# Patient Record
Sex: MALE | Race: BLACK OR AFRICAN AMERICAN | NOT HISPANIC OR LATINO | Employment: FULL TIME | URBAN - METROPOLITAN AREA
[De-identification: names, ages, dates, MRNs, and addresses within clinical notes are randomized per-mention and may not be internally consistent; named-entity substitution may affect disease eponyms.]

---

## 2022-01-03 ENCOUNTER — OFFICE VISIT (OUTPATIENT)
Dept: URGENT CARE | Facility: URGENT CARE | Age: 27
End: 2022-01-03
Payer: MEDICAID

## 2022-01-03 VITALS
SYSTOLIC BLOOD PRESSURE: 116 MMHG | RESPIRATION RATE: 16 BRPM | DIASTOLIC BLOOD PRESSURE: 70 MMHG | HEART RATE: 44 BPM | TEMPERATURE: 98.4 F | OXYGEN SATURATION: 100 %

## 2022-01-03 DIAGNOSIS — R05.9 COUGH: Primary | ICD-10-CM

## 2022-01-03 DIAGNOSIS — R11.2 NAUSEA AND VOMITING, INTRACTABILITY OF VOMITING NOT SPECIFIED, UNSPECIFIED VOMITING TYPE: ICD-10-CM

## 2022-01-03 LAB
DEPRECATED S PYO AG THROAT QL EIA: NEGATIVE
FLUAV AG SPEC QL IA: NEGATIVE
FLUBV AG SPEC QL IA: NEGATIVE
GROUP A STREP BY PCR: NOT DETECTED

## 2022-01-03 PROCEDURE — U0005 INFEC AGEN DETEC AMPLI PROBE: HCPCS | Performed by: PHYSICIAN ASSISTANT

## 2022-01-03 PROCEDURE — 99204 OFFICE O/P NEW MOD 45 MIN: CPT | Performed by: PHYSICIAN ASSISTANT

## 2022-01-03 PROCEDURE — U0003 INFECTIOUS AGENT DETECTION BY NUCLEIC ACID (DNA OR RNA); SEVERE ACUTE RESPIRATORY SYNDROME CORONAVIRUS 2 (SARS-COV-2) (CORONAVIRUS DISEASE [COVID-19]), AMPLIFIED PROBE TECHNIQUE, MAKING USE OF HIGH THROUGHPUT TECHNOLOGIES AS DESCRIBED BY CMS-2020-01-R: HCPCS | Performed by: PHYSICIAN ASSISTANT

## 2022-01-03 PROCEDURE — 87651 STREP A DNA AMP PROBE: CPT | Performed by: PHYSICIAN ASSISTANT

## 2022-01-03 PROCEDURE — 87804 INFLUENZA ASSAY W/OPTIC: CPT | Performed by: PHYSICIAN ASSISTANT

## 2022-01-03 RX ORDER — DEXTROMETHORPHAN POLISTIREX 30 MG/5ML
60 SUSPENSION ORAL 2 TIMES DAILY
Qty: 148 ML | Refills: 0 | Status: SHIPPED | OUTPATIENT
Start: 2022-01-03 | End: 2024-01-24

## 2022-01-03 RX ORDER — ONDANSETRON 4 MG/1
4 TABLET, ORALLY DISINTEGRATING ORAL EVERY 8 HOURS PRN
Qty: 12 TABLET | Refills: 0 | Status: SHIPPED | OUTPATIENT
Start: 2022-01-03 | End: 2024-01-24

## 2022-01-03 NOTE — PROGRESS NOTES
Assessment & Plan     Cough  Delsym for cough  covid pending,  Check my chart  - Streptococcus A Rapid Screen w/Reflex to PCR  - Symptomatic; Yes; 12/30/2021 COVID-19 Virus (Coronavirus) by PCR Nose  - Influenza A/B antigen  - Group A Streptococcus PCR Throat Swab  - dextromethorphan (DELSYM) 30 MG/5ML liquid; Take 10 mLs (60 mg) by mouth 2 times daily    Nausea and vomiting, intractability of vomiting not specified, unspecified vomiting type  zofran for nausea  Strep and flu neg  - ondansetron (ZOFRAN-ODT) 4 MG ODT tab; Take 1 tablet (4 mg) by mouth every 8 hours as needed for nausea    Review of external notes as documented elsewhere in note  30 minutes spent on the date of the encounter doing chart review, review of outside records, review of test results, interpretation of tests, patient visit and documentation        No follow-ups on file.    Timoteo Rivas PA-C  [facility] is a 26 year old who presents for the following health issues     HPI     Nausea and vomiting  cough    Review of Systems   Constitutional, HEENT, cardiovascular, pulmonary, gi and gu systems are negative, except as otherwise noted.      Objective    /70   Pulse (!) 44   Temp 98.4  F (36.9  C) (Tympanic)   Resp 16   SpO2 100%   There is no height or weight on file to calculate BMI.  Physical Exam   GENERAL: healthy, alert and no distress  EYES: Eyes grossly normal to inspection, PERRL and conjunctivae and sclerae normal  HENT: normal cephalic/atraumatic, ear canals and TM's normal, nose and mouth without ulcers or lesions, oropharynx clear and oral mucous membranes moist  NECK: no adenopathy  RESP: lungs clear to auscultation - no rales, rhonchi or wheezes  CV: regular rate and rhythm, normal S1 S2, no S3 or S4, no murmur, click or rub, no peripheral edema and peripheral pulses strong  MS: no gross musculoskeletal defects noted, no edema  SKIN: no suspicious lesions or  rashes  NEURO: Normal strength and tone, mentation intact and speech normal  PSYCH: mentation appears normal, affect normal/bright    Results for orders placed or performed in visit on 01/03/22   Streptococcus A Rapid Screen w/Reflex to PCR     Status: Normal    Specimen: Throat; Swab   Result Value Ref Range    Group A Strep antigen Negative Negative   Influenza A/B antigen     Status: Normal    Specimen: Nasopharyngeal; Swab   Result Value Ref Range    Influenza A antigen Negative Negative    Influenza B antigen Negative Negative    Narrative    Test results must be correlated with clinical data. If necessary, results should be confirmed by a molecular assay or viral culture.

## 2022-01-04 LAB — SARS-COV-2 RNA RESP QL NAA+PROBE: POSITIVE

## 2022-02-06 ENCOUNTER — HEALTH MAINTENANCE LETTER (OUTPATIENT)
Age: 27
End: 2022-02-06

## 2022-04-07 ENCOUNTER — HOSPITAL ENCOUNTER (EMERGENCY)
Facility: CLINIC | Age: 27
Discharge: HOME | End: 2022-04-07
Attending: EMERGENCY MEDICINE | Admitting: EMERGENCY MEDICINE
Payer: MEDICAID

## 2022-04-07 ENCOUNTER — APPOINTMENT (OUTPATIENT)
Dept: GENERAL RADIOLOGY | Facility: CLINIC | Age: 27
End: 2022-04-07
Attending: EMERGENCY MEDICINE
Payer: MEDICAID

## 2022-04-07 VITALS
HEIGHT: 73 IN | WEIGHT: 160 LBS | DIASTOLIC BLOOD PRESSURE: 81 MMHG | SYSTOLIC BLOOD PRESSURE: 128 MMHG | OXYGEN SATURATION: 99 % | BODY MASS INDEX: 21.2 KG/M2 | HEART RATE: 62 BPM | RESPIRATION RATE: 17 BRPM | TEMPERATURE: 97.9 F

## 2022-04-07 DIAGNOSIS — R07.9 ACUTE CHEST PAIN: ICD-10-CM

## 2022-04-07 DIAGNOSIS — E87.6 HYPOKALEMIA: ICD-10-CM

## 2022-04-07 LAB
ALBUMIN SERPL-MCNC: 3.9 G/DL (ref 3.4–5)
ALP SERPL-CCNC: 83 U/L (ref 40–150)
ALT SERPL W P-5'-P-CCNC: 23 U/L (ref 0–70)
ANION GAP SERPL CALCULATED.3IONS-SCNC: 5 MMOL/L (ref 3–14)
AST SERPL W P-5'-P-CCNC: 17 U/L (ref 0–45)
ATRIAL RATE - MUSE: 60 BPM
BASOPHILS # BLD AUTO: 0 10E3/UL (ref 0–0.2)
BASOPHILS NFR BLD AUTO: 1 %
BILIRUB SERPL-MCNC: 2.1 MG/DL (ref 0.2–1.3)
BUN SERPL-MCNC: 16 MG/DL (ref 7–30)
CALCIUM SERPL-MCNC: 9.3 MG/DL (ref 8.5–10.1)
CHLORIDE BLD-SCNC: 105 MMOL/L (ref 94–109)
CO2 SERPL-SCNC: 25 MMOL/L (ref 20–32)
CREAT SERPL-MCNC: 0.85 MG/DL (ref 0.66–1.25)
D DIMER PPP FEU-MCNC: <0.27 UG/ML FEU (ref 0–0.5)
DIASTOLIC BLOOD PRESSURE - MUSE: NORMAL MMHG
EOSINOPHIL # BLD AUTO: 0 10E3/UL (ref 0–0.7)
EOSINOPHIL NFR BLD AUTO: 1 %
ERYTHROCYTE [DISTWIDTH] IN BLOOD BY AUTOMATED COUNT: 11.9 % (ref 10–15)
GFR SERPL CREATININE-BSD FRML MDRD: >90 ML/MIN/1.73M2
GLUCOSE BLD-MCNC: 153 MG/DL (ref 70–99)
HCT VFR BLD AUTO: 44.3 % (ref 40–53)
HGB BLD-MCNC: 15.2 G/DL (ref 13.3–17.7)
IMM GRANULOCYTES # BLD: 0 10E3/UL
IMM GRANULOCYTES NFR BLD: 0 %
INTERPRETATION ECG - MUSE: NORMAL
LIPASE SERPL-CCNC: 28 U/L (ref 73–393)
LYMPHOCYTES # BLD AUTO: 1.7 10E3/UL (ref 0.8–5.3)
LYMPHOCYTES NFR BLD AUTO: 52 %
MCH RBC QN AUTO: 31 PG (ref 26.5–33)
MCHC RBC AUTO-ENTMCNC: 34.3 G/DL (ref 31.5–36.5)
MCV RBC AUTO: 90 FL (ref 78–100)
MONOCYTES # BLD AUTO: 0.3 10E3/UL (ref 0–1.3)
MONOCYTES NFR BLD AUTO: 10 %
NEUTROPHILS # BLD AUTO: 1.2 10E3/UL (ref 1.6–8.3)
NEUTROPHILS NFR BLD AUTO: 36 %
NRBC # BLD AUTO: 0 10E3/UL
NRBC BLD AUTO-RTO: 0 /100
P AXIS - MUSE: 81 DEGREES
PLATELET # BLD AUTO: 255 10E3/UL (ref 150–450)
POTASSIUM BLD-SCNC: 3.1 MMOL/L (ref 3.4–5.3)
PR INTERVAL - MUSE: 154 MS
PROT SERPL-MCNC: 7.9 G/DL (ref 6.8–8.8)
QRS DURATION - MUSE: 98 MS
QT - MUSE: 420 MS
QTC - MUSE: 420 MS
R AXIS - MUSE: 65 DEGREES
RBC # BLD AUTO: 4.91 10E6/UL (ref 4.4–5.9)
SODIUM SERPL-SCNC: 135 MMOL/L (ref 133–144)
SYSTOLIC BLOOD PRESSURE - MUSE: NORMAL MMHG
T AXIS - MUSE: 42 DEGREES
TROPONIN I SERPL HS-MCNC: 5 NG/L
VENTRICULAR RATE- MUSE: 60 BPM
WBC # BLD AUTO: 3.3 10E3/UL (ref 4–11)

## 2022-04-07 PROCEDURE — 85025 COMPLETE CBC W/AUTO DIFF WBC: CPT | Performed by: EMERGENCY MEDICINE

## 2022-04-07 PROCEDURE — 93005 ELECTROCARDIOGRAM TRACING: CPT

## 2022-04-07 PROCEDURE — 99285 EMERGENCY DEPT VISIT HI MDM: CPT | Mod: 25

## 2022-04-07 PROCEDURE — 96374 THER/PROPH/DIAG INJ IV PUSH: CPT

## 2022-04-07 PROCEDURE — 258N000003 HC RX IP 258 OP 636: Performed by: EMERGENCY MEDICINE

## 2022-04-07 PROCEDURE — 80053 COMPREHEN METABOLIC PANEL: CPT | Performed by: EMERGENCY MEDICINE

## 2022-04-07 PROCEDURE — 84484 ASSAY OF TROPONIN QUANT: CPT | Performed by: EMERGENCY MEDICINE

## 2022-04-07 PROCEDURE — 83690 ASSAY OF LIPASE: CPT | Performed by: EMERGENCY MEDICINE

## 2022-04-07 PROCEDURE — 96361 HYDRATE IV INFUSION ADD-ON: CPT

## 2022-04-07 PROCEDURE — 36415 COLL VENOUS BLD VENIPUNCTURE: CPT | Performed by: EMERGENCY MEDICINE

## 2022-04-07 PROCEDURE — 85379 FIBRIN DEGRADATION QUANT: CPT | Performed by: EMERGENCY MEDICINE

## 2022-04-07 PROCEDURE — 250N000013 HC RX MED GY IP 250 OP 250 PS 637: Performed by: EMERGENCY MEDICINE

## 2022-04-07 PROCEDURE — 250N000011 HC RX IP 250 OP 636: Performed by: EMERGENCY MEDICINE

## 2022-04-07 PROCEDURE — 71046 X-RAY EXAM CHEST 2 VIEWS: CPT

## 2022-04-07 RX ORDER — KETOROLAC TROMETHAMINE 15 MG/ML
15 INJECTION, SOLUTION INTRAMUSCULAR; INTRAVENOUS ONCE
Status: COMPLETED | OUTPATIENT
Start: 2022-04-07 | End: 2022-04-07

## 2022-04-07 RX ORDER — POTASSIUM CHLORIDE 1500 MG/1
40 TABLET, EXTENDED RELEASE ORAL ONCE
Status: COMPLETED | OUTPATIENT
Start: 2022-04-07 | End: 2022-04-07

## 2022-04-07 RX ADMIN — POTASSIUM CHLORIDE 40 MEQ: 1500 TABLET, EXTENDED RELEASE ORAL at 16:18

## 2022-04-07 RX ADMIN — SODIUM CHLORIDE 1000 ML: 9 INJECTION, SOLUTION INTRAVENOUS at 14:54

## 2022-04-07 RX ADMIN — KETOROLAC TROMETHAMINE 15 MG: 15 INJECTION, SOLUTION INTRAMUSCULAR; INTRAVENOUS at 15:00

## 2022-04-07 ASSESSMENT — ENCOUNTER SYMPTOMS
VOMITING: 0
DIARRHEA: 0
MYALGIAS: 0
FEVER: 0
DIAPHORESIS: 1
COUGH: 0
ABDOMINAL PAIN: 0
SHORTNESS OF BREATH: 1

## 2022-04-07 NOTE — ED NOTES
Bed: ED21  Expected date: 4/7/22  Expected time: 2:21 PM  Means of arrival:   Comments:  BV1  10 min  Chest pain, SOB

## 2022-04-07 NOTE — ED PROVIDER NOTES
History   Chief Complaint:  Chest Pain     The history is provided by the patient.      Opal Tan is a 26 year old male who presents via EMS with chest pain. Patient developed sharp midsternal and left sided chest pain suddenly while eating a fish sandwich. He became very hot and started sweating. He had difficulty breathing then had a syncopal episode. He did not hit his head when he passed out. He woke up on the couch and was going in and out. When EMS came to his house he was more alert and calm. Here in the ED he still has chest pain but other symptoms are resolved. His pain is intermittent now. It does not worsen with movement or deep breaths. He does not feel like anything got stuck in his throat. He has been fasting for the past 4-5 days for Mandaen purposes. Denies vomiting. No leg pain or swelling. No cough or fever. No abdominal pain or diarrhea. No recent travel. No recent injury or trauma. His mother and grandmother have had blood clots. No known clotting disorder. He started a new job yesterday.     Review of Systems   Constitutional: Positive for diaphoresis. Negative for fever.   Respiratory: Positive for shortness of breath. Negative for cough.    Cardiovascular: Positive for chest pain. Negative for leg swelling.   Gastrointestinal: Negative for abdominal pain, diarrhea and vomiting.   Musculoskeletal: Negative for myalgias.   Neurological: Positive for syncope.   All other systems reviewed and are negative.    Allergies:  The patient has no known allergies.     Medications:  The patient denies the use of medications.     Past Medical History:     The patient denies past medical history.       Social History:  Presents via EMS   Presents to ED with his girlfriend   Occasional alcohol use  No drug use     Physical Exam     Patient Vitals for the past 24 hrs:   BP Temp Temp src Pulse Resp SpO2 Height Weight   04/07/22 1447 106/77 -- -- 60 -- 100 % -- --   04/07/22 1439 106/72 97.9  F  "(36.6  C) Oral 59 18 100 % 1.854 m (6' 1\") 72.6 kg (160 lb)       Physical Exam  Nursing note and vitals reviewed.  Constitutional:  Appears well-developed and well-nourished.   HENT:   Head:    Atraumatic.   Mouth/Throat:   Oropharynx is clear and moist. No oropharyngeal exudate.   Eyes:    Pupils are equal, round, and reactive to light.   Neck:    Normal range of motion. Neck supple.      No tracheal deviation present. No thyromegaly present.   Cardiovascular:  Normal rate, regular rhythm, no murmur   Pulmonary/Chest: Breath sounds are clear and equal without wheezes or crackles. Nontender chest wall.   Abdominal:   Soft. Bowel sounds are normal. Exhibits no distension and      no mass. There is no tenderness.      There is no rebound and no guarding.   Musculoskeletal:  Exhibits no edema.   Lymphadenopathy:  No cervical adenopathy.   Neurological:   Alert and oriented to person, place, and time.      GCS 15.  CN 2-12 intact.  and proximal upper extremity strength strong and equal.  Bilateral lower extremity strength strong and equal, including strong dorsiflexion and plantarflexion strength.  Sensation intact and equal to the face, arms and legs.  No facial droop or weakness. Normal speech.  Follows commands and answers questions normally.    Skin:    Skin is warm and dry. No rash noted. No pallor.    Emergency Department Course   ECG  ECG obtained at 1443, ECG read at 1445  Normal sinus rhythm  Right atrial enlargement   Minimal voltage criteria for LVH, may be normal variant   Nonspecific ST abnormality   Abnormal ECG    Rate 60 bpm. NM interval 154 ms. QRS duration 98 ms. QT/QTc 420/420 ms. P-R-T axes 81 65 42.     Imaging:  Chest XR,  PA & LAT   Final Result   IMPRESSION: PA and lateral views of the chest were obtained.   Cardiomediastinal silhouette is within normal limits. No suspicious   focal pulmonary opacities. No significant pleural effusion or   pneumothorax.      ASIA HASSAN MD          "   SYSTEM ID:  [id number]      Report per radiology    Laboratory:  Labs Ordered and Resulted from Time of ED Arrival to Time of ED Departure   COMPREHENSIVE METABOLIC PANEL - Abnormal       Result Value    Sodium 135      Potassium 3.1 (*)     Chloride 105      Carbon Dioxide (CO2) 25      Anion Gap 5      Urea Nitrogen 16      Creatinine 0.85      Calcium 9.3      Glucose 153 (*)     Alkaline Phosphatase 83      AST 17      ALT 23      Protein Total 7.9      Albumin 3.9      Bilirubin Total 2.1 (*)     GFR Estimate >90     LIPASE - Abnormal    Lipase 28 (*)    CBC WITH PLATELETS AND DIFFERENTIAL - Abnormal    WBC Count 3.3 (*)     RBC Count 4.91      Hemoglobin 15.2      Hematocrit 44.3      MCV 90      MCH 31.0      MCHC 34.3      RDW 11.9      Platelet Count 255      % Neutrophils 36      % Lymphocytes 52      % Monocytes 10      % Eosinophils 1      % Basophils 1      % Immature Granulocytes 0      NRBCs per 100 WBC 0      Absolute Neutrophils 1.2 (*)     Absolute Lymphocytes 1.7      Absolute Monocytes 0.3      Absolute Eosinophils 0.0      Absolute Basophils 0.0      Absolute Immature Granulocytes 0.0      Absolute NRBCs 0.0     D DIMER QUANTITATIVE - Normal    D-Dimer Quantitative <0.27     TROPONIN I - Normal    Troponin I High Sensitivity 5        Emergency Department Course:     Reviewed:  I reviewed nursing notes, vitals, past medical history and Care Everywhere    Assessments:  1447 I obtained history and examined the patient as noted above.   1617 I rechecked and updated the patient.   1641 I rechecked the patient and explained findings.     Interventions:  1454 NS 1L IV   1500 Toradol 15 mg IV   1618 Klor-con m 40 mEq PO     Disposition:  The patient was discharged to home.     Impression & Plan     Medical Decision Making:  Opal Tan is a 26 year old male who presents with chest pain.  The work up in the Emergency Department is negative.  I considered a broad differential diagnosis in this  patient including life-threatening etiologies such as acute coronary syndrome, myocardial infarction, pulmonary embolism, acute aortic dissection, myocarditis, pericarditis,  amongst others.  Other causes considered for this patient included pneumonia, pneumothorax, chest wall source, pericarditis, pleurisy, esophageal spasm, etc.  No sign of pneumothorax or pneumonia on chest x-ray. Dimer was normal. No signs suggesting PE. No serious etiology for the chest pain were detected today during this visit.  Close follow up with primary care is indicated should the pain continue, as further work up may be performed; this was made clear to the patient, who understands.      Diagnosis:    ICD-10-CM    1. Acute chest pain  R07.9    2. Hypokalemia  E87.6        Scribe Disclosure:  I, Quintin Lopez, am serving as a scribe at 2:35 PM on 4/7/2022 to document services personally performed by Liset Yancey MD based on my observations and the provider's statements to me.            Liset Yancey MD  04/07/22 3363

## 2022-04-07 NOTE — ED TRIAGE NOTES
Arrives via EMS from home with chest pain.  Patient reports was eating a fish sandwich when sudden onset of midsternal chest pain.  States became diaphoretic and short of breath then fainted. 4lead in route unremarkable. LS clear t/o. VSS.  Chest pain rating 6/10 at this time, radiating to throat area. ABCD's intact; A/O x 4.

## 2022-04-12 ENCOUNTER — HOSPITAL ENCOUNTER (EMERGENCY)
Facility: CLINIC | Age: 27
Discharge: HOME | End: 2022-04-12
Attending: EMERGENCY MEDICINE | Admitting: EMERGENCY MEDICINE
Payer: MEDICAID

## 2022-04-12 ENCOUNTER — APPOINTMENT (OUTPATIENT)
Dept: GENERAL RADIOLOGY | Facility: CLINIC | Age: 27
End: 2022-04-12
Attending: EMERGENCY MEDICINE
Payer: MEDICAID

## 2022-04-12 VITALS
DIASTOLIC BLOOD PRESSURE: 71 MMHG | HEART RATE: 52 BPM | BODY MASS INDEX: 21.77 KG/M2 | TEMPERATURE: 98.8 F | OXYGEN SATURATION: 97 % | WEIGHT: 165 LBS | RESPIRATION RATE: 20 BRPM | SYSTOLIC BLOOD PRESSURE: 108 MMHG

## 2022-04-12 DIAGNOSIS — R07.89 OTHER CHEST PAIN: ICD-10-CM

## 2022-04-12 DIAGNOSIS — J21.0 RSV BRONCHIOLITIS: ICD-10-CM

## 2022-04-12 DIAGNOSIS — R60.9 EDEMA, UNSPECIFIED TYPE: ICD-10-CM

## 2022-04-12 LAB
ALBUMIN SERPL-MCNC: 3.3 G/DL (ref 3.4–5)
ALP SERPL-CCNC: 133 U/L (ref 40–150)
ALT SERPL W P-5'-P-CCNC: 47 U/L (ref 0–70)
ANION GAP SERPL CALCULATED.3IONS-SCNC: 3 MMOL/L (ref 3–14)
AST SERPL W P-5'-P-CCNC: 68 U/L (ref 0–45)
BASOPHILS # BLD AUTO: 0 10E3/UL (ref 0–0.2)
BASOPHILS NFR BLD AUTO: 1 %
BILIRUB DIRECT SERPL-MCNC: 0.2 MG/DL (ref 0–0.2)
BILIRUB SERPL-MCNC: 0.4 MG/DL (ref 0.2–1.3)
BUN SERPL-MCNC: 11 MG/DL (ref 7–30)
CALCIUM SERPL-MCNC: 8.3 MG/DL (ref 8.5–10.1)
CHLORIDE BLD-SCNC: 108 MMOL/L (ref 94–109)
CO2 SERPL-SCNC: 28 MMOL/L (ref 20–32)
CREAT SERPL-MCNC: 0.78 MG/DL (ref 0.66–1.25)
D DIMER PPP FEU-MCNC: <0.27 UG/ML FEU (ref 0–0.5)
EOSINOPHIL # BLD AUTO: 0.3 10E3/UL (ref 0–0.7)
EOSINOPHIL NFR BLD AUTO: 6 %
ERYTHROCYTE [DISTWIDTH] IN BLOOD BY AUTOMATED COUNT: 12.3 % (ref 10–15)
FLUAV RNA SPEC QL NAA+PROBE: NEGATIVE
FLUBV RNA RESP QL NAA+PROBE: NEGATIVE
GFR SERPL CREATININE-BSD FRML MDRD: >90 ML/MIN/1.73M2
GLUCOSE BLD-MCNC: 91 MG/DL (ref 70–99)
HCT VFR BLD AUTO: 39.5 % (ref 40–53)
HGB BLD-MCNC: 12.9 G/DL (ref 13.3–17.7)
HOLD SPECIMEN: NORMAL
HOLD SPECIMEN: NORMAL
IMM GRANULOCYTES # BLD: 0 10E3/UL
IMM GRANULOCYTES NFR BLD: 0 %
INR PPP: 1.12 (ref 0.85–1.15)
LYMPHOCYTES # BLD AUTO: 1.9 10E3/UL (ref 0.8–5.3)
LYMPHOCYTES NFR BLD AUTO: 41 %
MCH RBC QN AUTO: 30.6 PG (ref 26.5–33)
MCHC RBC AUTO-ENTMCNC: 32.7 G/DL (ref 31.5–36.5)
MCV RBC AUTO: 94 FL (ref 78–100)
MONOCYTES # BLD AUTO: 0.5 10E3/UL (ref 0–1.3)
MONOCYTES NFR BLD AUTO: 10 %
MONOCYTES NFR BLD AUTO: NEGATIVE %
NEUTROPHILS # BLD AUTO: 2 10E3/UL (ref 1.6–8.3)
NEUTROPHILS NFR BLD AUTO: 42 %
NRBC # BLD AUTO: 0 10E3/UL
NRBC BLD AUTO-RTO: 0 /100
PLATELET # BLD AUTO: 221 10E3/UL (ref 150–450)
POTASSIUM BLD-SCNC: 3.9 MMOL/L (ref 3.4–5.3)
PROT SERPL-MCNC: 6.8 G/DL (ref 6.8–8.8)
RBC # BLD AUTO: 4.21 10E6/UL (ref 4.4–5.9)
RSV RNA SPEC NAA+PROBE: POSITIVE
SARS-COV-2 RNA RESP QL NAA+PROBE: NEGATIVE
SODIUM SERPL-SCNC: 139 MMOL/L (ref 133–144)
TROPONIN I SERPL HS-MCNC: 5 NG/L
WBC # BLD AUTO: 4.6 10E3/UL (ref 4–11)

## 2022-04-12 PROCEDURE — 36415 COLL VENOUS BLD VENIPUNCTURE: CPT | Performed by: EMERGENCY MEDICINE

## 2022-04-12 PROCEDURE — 250N000013 HC RX MED GY IP 250 OP 250 PS 637: Performed by: EMERGENCY MEDICINE

## 2022-04-12 PROCEDURE — 96360 HYDRATION IV INFUSION INIT: CPT

## 2022-04-12 PROCEDURE — 99285 EMERGENCY DEPT VISIT HI MDM: CPT | Mod: 25

## 2022-04-12 PROCEDURE — 85379 FIBRIN DEGRADATION QUANT: CPT | Performed by: EMERGENCY MEDICINE

## 2022-04-12 PROCEDURE — 71046 X-RAY EXAM CHEST 2 VIEWS: CPT

## 2022-04-12 PROCEDURE — C9803 HOPD COVID-19 SPEC COLLECT: HCPCS

## 2022-04-12 PROCEDURE — 85025 COMPLETE CBC W/AUTO DIFF WBC: CPT | Performed by: EMERGENCY MEDICINE

## 2022-04-12 PROCEDURE — 85610 PROTHROMBIN TIME: CPT | Performed by: EMERGENCY MEDICINE

## 2022-04-12 PROCEDURE — 258N000003 HC RX IP 258 OP 636: Performed by: EMERGENCY MEDICINE

## 2022-04-12 PROCEDURE — 80048 BASIC METABOLIC PNL TOTAL CA: CPT | Performed by: EMERGENCY MEDICINE

## 2022-04-12 PROCEDURE — 93005 ELECTROCARDIOGRAM TRACING: CPT

## 2022-04-12 PROCEDURE — 87637 SARSCOV2&INF A&B&RSV AMP PRB: CPT | Performed by: EMERGENCY MEDICINE

## 2022-04-12 PROCEDURE — 84484 ASSAY OF TROPONIN QUANT: CPT | Performed by: EMERGENCY MEDICINE

## 2022-04-12 PROCEDURE — 86308 HETEROPHILE ANTIBODY SCREEN: CPT | Performed by: EMERGENCY MEDICINE

## 2022-04-12 PROCEDURE — 82040 ASSAY OF SERUM ALBUMIN: CPT | Performed by: EMERGENCY MEDICINE

## 2022-04-12 RX ORDER — ASPIRIN 81 MG/1
324 TABLET, CHEWABLE ORAL ONCE
Status: COMPLETED | OUTPATIENT
Start: 2022-04-12 | End: 2022-04-12

## 2022-04-12 RX ORDER — SODIUM CHLORIDE 9 MG/ML
INJECTION, SOLUTION INTRAVENOUS CONTINUOUS
Status: DISCONTINUED | OUTPATIENT
Start: 2022-04-12 | End: 2022-04-13 | Stop reason: HOSPADM

## 2022-04-12 RX ADMIN — ASPIRIN 81 MG 324 MG: 81 TABLET ORAL at 21:54

## 2022-04-12 RX ADMIN — SODIUM CHLORIDE 500 ML: 9 INJECTION, SOLUTION INTRAVENOUS at 21:47

## 2022-04-13 LAB
ATRIAL RATE - MUSE: 53 BPM
DIASTOLIC BLOOD PRESSURE - MUSE: NORMAL MMHG
INTERPRETATION ECG - MUSE: NORMAL
P AXIS - MUSE: 27 DEGREES
PR INTERVAL - MUSE: 160 MS
QRS DURATION - MUSE: 90 MS
QT - MUSE: 452 MS
QTC - MUSE: 424 MS
R AXIS - MUSE: 56 DEGREES
SYSTOLIC BLOOD PRESSURE - MUSE: NORMAL MMHG
T AXIS - MUSE: 35 DEGREES
VENTRICULAR RATE- MUSE: 53 BPM

## 2022-04-13 NOTE — ED TRIAGE NOTES
Pt arrives from home w/ complaints of new bilat leg swelling and feet swelling. Pt reports being seen last week in ED for CP, SOB, and syncope. Pt reports today having new non-pitting edema in his legs and feet. Pt reports fatigue, intm sharp left sided CP w/ assoc SOB. Pt reports having another ep of syncope 3 days ago, denies hitting his head. A&O x 4.

## 2022-04-13 NOTE — ED PROVIDER NOTES
History     Chief Complaint:  Chest pain    HPI   Opal Tan is a 26 year old male who presents with complaints of chest pain on the left sternal border intermittently for the last several days.  The patient had been seen 5 days ago with similar symptoms but reports now he has fatigue 1 day of lower extremity swelling.  He says the pain is sharp he had a previous syncopal episode before the the other episode he has felt lightheaded especially with standing over the stove but has not passed out.  The patient said he has felt fatigue has had no sick contacts no vomiting no diarrhea.  He denies any recent bleeding and presented to the ER.  The patient said that he has had recent antibiotics due to a spider bite on his left leg reports the symptoms are better.  He does not have pain over his abdomen back or lower extremities.    Review of Systems  10 point review of systems all negative in HPI    Allergies:    No Known Allergies      Medications:      dextromethorphan (DELSYM) 30 MG/5ML liquid  ondansetron (ZOFRAN-ODT) 4 MG ODT tab        Past Medical History:    hypokalemia     Past Surgical History:    None      Social History:  Occasional alcohol use    Physical Exam     Patient Vitals for the past 24 hrs:   BP Temp Temp src Pulse Resp SpO2 Weight   04/12/22 2253 -- -- -- -- -- 97 % --   04/12/22 2252 108/71 -- -- 52 -- 97 % --   04/12/22 1940 116/58 98.8  F (37.1  C) Temporal 66 20 98 % 74.8 kg (165 lb)       Physical Exam  General: The patient is alert, in no respiratory distress.    HENT: Mucous membranes moist    Cardiovascular: Regular rate and rhythm. Good pulses in all four extremities. Normal capillary refill and skin turgor.     Respiratory: Lungs are clear. No nasal flaring. No retractions. No wheezing, no crackles.    Gastrointestinal: Abdomen soft. No guarding, no rebound. No palpable hernias.     Musculoskeletal: No gross deformity.     Skin: No rashes or petechiae.  Small healing skin  ulceration of the left calf    Neurologic: The patient is alert and oriented he has adequate strength follows commands and is appropriately interactive    Lymphatic: No cervical adenopathy.  Nonpitting but mild bilateral lower extremity edema    Psychiatric: The patient is non-tearful.    Emergency Department Course   ECG:\  Normal sinus rhythm no pathologic ST ovation but rate of 53 LA interval 160 Curosurf 90 and a QTC of 424.  There is some early repole    Imaging:  XR Chest 2 Views   Final Result   IMPRESSION: Negative chest.          Laboratory:  Labs Ordered and Resulted from Time of ED Arrival to Time of ED Departure   BASIC METABOLIC PANEL - Abnormal       Result Value    Sodium 139      Potassium 3.9      Chloride 108      Carbon Dioxide (CO2) 28      Anion Gap 3      Urea Nitrogen 11      Creatinine 0.78      Calcium 8.3 (*)     Glucose 91      GFR Estimate >90     CBC WITH PLATELETS AND DIFFERENTIAL - Abnormal    WBC Count 4.6      RBC Count 4.21 (*)     Hemoglobin 12.9 (*)     Hematocrit 39.5 (*)     MCV 94      MCH 30.6      MCHC 32.7      RDW 12.3      Platelet Count 221      % Neutrophils 42      % Lymphocytes 41      % Monocytes 10      % Eosinophils 6      % Basophils 1      % Immature Granulocytes 0      NRBCs per 100 WBC 0      Absolute Neutrophils 2.0      Absolute Lymphocytes 1.9      Absolute Monocytes 0.5      Absolute Eosinophils 0.3      Absolute Basophils 0.0      Absolute Immature Granulocytes 0.0      Absolute NRBCs 0.0     HEPATIC FUNCTION PANEL - Abnormal    Bilirubin Total 0.4      Bilirubin Direct 0.2      Protein Total 6.8      Albumin 3.3 (*)     Alkaline Phosphatase 133      AST 68 (*)     ALT 47     INFLUENZA A/B & SARS-COV2 PCR MULTIPLEX - Abnormal    Influenza A PCR Negative      Influenza B PCR Negative      RSV PCR Positive (*)     SARS CoV2 PCR Negative     TROPONIN I - Normal    Troponin I High Sensitivity 5     INR - Normal    INR 1.12     D DIMER QUANTITATIVE - Normal     D-Dimer Quantitative <0.27     MONONUCLEOSIS SCREEN - Normal    Mononucleosis Screen Negative         Procedures:      Emergency Department Course:           Reviewed:    I reviewed nursing notes, vitals and past history    Assessments:   I obtained history and examined the patient as noted above.    I rechecked the patient and explained findings.       Consults:            Interventions:    Medications   sodium chloride 0.9% infusion ( Intravenous Canceled Entry 4/12/22 2154)   aspirin (ASA) chewable tablet 324 mg (324 mg Oral Given 4/12/22 2154)   0.9% sodium chloride BOLUS (0 mLs Intravenous Stopped 4/12/22 2256)       Disposition:  The patient was discharged to home.    Impression & Plan        Medical Decision Making:  The patient's previous visits were reviewed he is no significant medical problems reports that he quit smoking previously.  His previous work-up including D-dimer was negative.  At this point the patient does have chest pain I considered pneumonia pneumothorax PE or even referred intra-abdominal causes.  With the patient's lower extremity edema I did consider DVT however this is less likely with lack of travel.  There has been a spider bite and reactive dependent edema is possible I do not think he has cardiomyopathy or renal insufficiency.  Labs were checked mono would explain his weakness if that is the case.  The patient does have a lower hemoglobin compared to previous but has not had bleeding I think he may have been relatively dehydrated before with hydration has been some delusional drop in his hemoglobin.  The patient is not particularly low at at this point but that I discussed that he would need follow-up to see what the trend is in the next couple of days.  The patient has bronchiolitis which likely explains his weakness.  He is otherwise stable I discussed the need for follow-up and he was discharged home in good condition        Diagnosis:    ICD-10-CM    1. Other chest pain  R07.89     2. RSV bronchiolitis  J21.0    3. Edema, unspecified type  R60.9        Discharge Medications:  Discharge Medication List as of 4/12/2022 11:15 PM               Linus Lynch MD  04/13/22 0040

## 2022-07-12 ENCOUNTER — HOSPITAL ENCOUNTER (EMERGENCY)
Facility: CLINIC | Age: 27
Discharge: HOME | End: 2022-07-13
Attending: EMERGENCY MEDICINE | Admitting: EMERGENCY MEDICINE
Payer: COMMERCIAL

## 2022-07-12 DIAGNOSIS — E86.0 MILD DEHYDRATION: ICD-10-CM

## 2022-07-12 LAB
BASOPHILS # BLD AUTO: 0 10E3/UL (ref 0–0.2)
BASOPHILS NFR BLD AUTO: 0 %
EOSINOPHIL # BLD AUTO: 0 10E3/UL (ref 0–0.7)
EOSINOPHIL NFR BLD AUTO: 0 %
ERYTHROCYTE [DISTWIDTH] IN BLOOD BY AUTOMATED COUNT: 12.8 % (ref 10–15)
HCT VFR BLD AUTO: 44 % (ref 40–53)
HGB BLD-MCNC: 14.3 G/DL (ref 13.3–17.7)
IMM GRANULOCYTES # BLD: 0.1 10E3/UL
IMM GRANULOCYTES NFR BLD: 1 %
LYMPHOCYTES # BLD AUTO: 1.1 10E3/UL (ref 0.8–5.3)
LYMPHOCYTES NFR BLD AUTO: 10 %
MCH RBC QN AUTO: 30.8 PG (ref 26.5–33)
MCHC RBC AUTO-ENTMCNC: 32.5 G/DL (ref 31.5–36.5)
MCV RBC AUTO: 95 FL (ref 78–100)
MONOCYTES # BLD AUTO: 0.8 10E3/UL (ref 0–1.3)
MONOCYTES NFR BLD AUTO: 7 %
NEUTROPHILS # BLD AUTO: 9 10E3/UL (ref 1.6–8.3)
NEUTROPHILS NFR BLD AUTO: 82 %
NRBC # BLD AUTO: 0 10E3/UL
NRBC BLD AUTO-RTO: 0 /100
PLATELET # BLD AUTO: 216 10E3/UL (ref 150–450)
RBC # BLD AUTO: 4.65 10E6/UL (ref 4.4–5.9)
WBC # BLD AUTO: 10.9 10E3/UL (ref 4–11)

## 2022-07-12 PROCEDURE — 99284 EMERGENCY DEPT VISIT MOD MDM: CPT | Mod: CS,25

## 2022-07-12 PROCEDURE — 36415 COLL VENOUS BLD VENIPUNCTURE: CPT | Performed by: EMERGENCY MEDICINE

## 2022-07-12 PROCEDURE — 96374 THER/PROPH/DIAG INJ IV PUSH: CPT

## 2022-07-12 PROCEDURE — 80048 BASIC METABOLIC PNL TOTAL CA: CPT | Performed by: EMERGENCY MEDICINE

## 2022-07-12 PROCEDURE — 250N000011 HC RX IP 250 OP 636: Performed by: EMERGENCY MEDICINE

## 2022-07-12 PROCEDURE — 82550 ASSAY OF CK (CPK): CPT | Performed by: EMERGENCY MEDICINE

## 2022-07-12 PROCEDURE — 93005 ELECTROCARDIOGRAM TRACING: CPT

## 2022-07-12 PROCEDURE — 96375 TX/PRO/DX INJ NEW DRUG ADDON: CPT

## 2022-07-12 PROCEDURE — 84484 ASSAY OF TROPONIN QUANT: CPT | Performed by: EMERGENCY MEDICINE

## 2022-07-12 PROCEDURE — 87637 SARSCOV2&INF A&B&RSV AMP PRB: CPT | Performed by: EMERGENCY MEDICINE

## 2022-07-12 PROCEDURE — 96361 HYDRATE IV INFUSION ADD-ON: CPT

## 2022-07-12 PROCEDURE — C9803 HOPD COVID-19 SPEC COLLECT: HCPCS

## 2022-07-12 PROCEDURE — 258N000003 HC RX IP 258 OP 636: Performed by: EMERGENCY MEDICINE

## 2022-07-12 PROCEDURE — 85025 COMPLETE CBC W/AUTO DIFF WBC: CPT | Performed by: EMERGENCY MEDICINE

## 2022-07-12 RX ORDER — ONDANSETRON 2 MG/ML
4 INJECTION INTRAMUSCULAR; INTRAVENOUS ONCE
Status: COMPLETED | OUTPATIENT
Start: 2022-07-12 | End: 2022-07-12

## 2022-07-12 RX ORDER — KETOROLAC TROMETHAMINE 15 MG/ML
15 INJECTION, SOLUTION INTRAMUSCULAR; INTRAVENOUS ONCE
Status: COMPLETED | OUTPATIENT
Start: 2022-07-12 | End: 2022-07-12

## 2022-07-12 RX ADMIN — KETOROLAC TROMETHAMINE 15 MG: 15 INJECTION, SOLUTION INTRAMUSCULAR; INTRAVENOUS at 23:35

## 2022-07-12 RX ADMIN — ONDANSETRON 4 MG: 2 INJECTION INTRAMUSCULAR; INTRAVENOUS at 23:35

## 2022-07-12 RX ADMIN — SODIUM CHLORIDE 1000 ML: 9 INJECTION, SOLUTION INTRAVENOUS at 23:35

## 2022-07-12 ASSESSMENT — ENCOUNTER SYMPTOMS
DIZZINESS: 1
FEVER: 0
VOMITING: 1
MYALGIAS: 1
NAUSEA: 1
HEADACHES: 1
SHORTNESS OF BREATH: 1
CHILLS: 1
FATIGUE: 1

## 2022-07-13 VITALS
RESPIRATION RATE: 9 BRPM | SYSTOLIC BLOOD PRESSURE: 92 MMHG | OXYGEN SATURATION: 100 % | HEART RATE: 55 BPM | TEMPERATURE: 98.8 F | DIASTOLIC BLOOD PRESSURE: 43 MMHG

## 2022-07-13 LAB
ANION GAP SERPL CALCULATED.3IONS-SCNC: 9 MMOL/L (ref 3–14)
ATRIAL RATE - MUSE: 50 BPM
BUN SERPL-MCNC: 22 MG/DL (ref 7–30)
CALCIUM SERPL-MCNC: 9.8 MG/DL (ref 8.5–10.1)
CHLORIDE BLD-SCNC: 104 MMOL/L (ref 94–109)
CK SERPL-CCNC: 352 U/L (ref 30–300)
CO2 SERPL-SCNC: 25 MMOL/L (ref 20–32)
CREAT SERPL-MCNC: 0.96 MG/DL (ref 0.66–1.25)
DIASTOLIC BLOOD PRESSURE - MUSE: NORMAL MMHG
FLUAV RNA SPEC QL NAA+PROBE: NEGATIVE
FLUBV RNA RESP QL NAA+PROBE: NEGATIVE
GFR SERPL CREATININE-BSD FRML MDRD: >90 ML/MIN/1.73M2
GLUCOSE BLD-MCNC: 84 MG/DL (ref 70–99)
INTERPRETATION ECG - MUSE: NORMAL
P AXIS - MUSE: 84 DEGREES
POTASSIUM BLD-SCNC: 3.7 MMOL/L (ref 3.4–5.3)
PR INTERVAL - MUSE: 152 MS
QRS DURATION - MUSE: 88 MS
QT - MUSE: 446 MS
QTC - MUSE: 406 MS
R AXIS - MUSE: 63 DEGREES
RSV RNA SPEC NAA+PROBE: NEGATIVE
SARS-COV-2 RNA RESP QL NAA+PROBE: NEGATIVE
SODIUM SERPL-SCNC: 138 MMOL/L (ref 133–144)
SYSTOLIC BLOOD PRESSURE - MUSE: NORMAL MMHG
T AXIS - MUSE: 46 DEGREES
TROPONIN I SERPL HS-MCNC: 8 NG/L
VENTRICULAR RATE- MUSE: 50 BPM

## 2022-07-13 RX ORDER — ONDANSETRON 4 MG/1
4 TABLET, ORALLY DISINTEGRATING ORAL EVERY 6 HOURS PRN
Qty: 10 TABLET | Refills: 0 | Status: SHIPPED | OUTPATIENT
Start: 2022-07-13 | End: 2022-07-16

## 2022-07-13 NOTE — ED TRIAGE NOTES
Pt worked out  At the gym, on the way maricarmen had a sudden onset of fatigue, n/v.  Now has the chills, body aches, and a headache.     Triage Assessment     Row Name 07/12/22 3736       Triage Assessment (Adult)    Airway WDL WDL       Respiratory WDL    Respiratory WDL WDL

## 2022-07-13 NOTE — ED PROVIDER NOTES
History   Chief Complaint:  Fatigue       HPI   Opal Tan is a 26 year old male who presents with fatigue. The patient states he was boxing as usual tonight and then on the drive home suddenly developed dizziness, shortness of breath and fatigue. When he got home he developed nausea and vomiting. He is now also experiencing chills, myalgias and a headache. He did nothing abnormal in his workout. Denies fever. He is not vaccinated against Covid-19.     Review of Systems   Constitutional: Positive for chills and fatigue. Negative for fever.   Respiratory: Positive for shortness of breath.    Gastrointestinal: Positive for nausea and vomiting.   Musculoskeletal: Positive for myalgias.   Neurological: Positive for dizziness and headaches.   All other systems reviewed and are negative.      Allergies:  No Known Drug Allergies    Medications:  None    Past Medical History:     Patient denies any known medical conditions    Social History:  The patient presents to the ED with a significant other  PCP: Sandoval Richardson MD    Physical Exam     Patient Vitals for the past 24 hrs:   BP Temp Temp src Pulse Resp SpO2   07/13/22 0012 -- 98.8  F (37.1  C) Oral -- -- --   07/12/22 2345 94/52 -- -- 55 14 100 %   07/12/22 2330 110/67 -- -- 53 17 99 %   07/12/22 2315 115/68 -- -- 53 18 98 %       Physical Exam  Vitals reviewed.   HENT:      Head: Normocephalic.      Mouth/Throat:      Mouth: Mucous membranes are moist.   Eyes:      Conjunctiva/sclera: Conjunctivae normal.      Pupils: Pupils are equal, round, and reactive to light.   Cardiovascular:      Rate and Rhythm: Normal rate and regular rhythm.   Pulmonary:      Effort: Pulmonary effort is normal.      Breath sounds: Normal breath sounds.   Abdominal:      General: Abdomen is flat.      Palpations: Abdomen is soft.   Skin:     General: Skin is warm.      Capillary Refill: Capillary refill takes less than 2 seconds.   Neurological:      General: No focal deficit  present.      Mental Status: He is alert and oriented to person, place, and time.   Psychiatric:         Mood and Affect: Mood normal.           Emergency Department Course   ECG  ECG results from 07/12/22   EKG 12 lead     Value    Systolic Blood Pressure     Diastolic Blood Pressure     Ventricular Rate 50    Atrial Rate 50    DE Interval 152    QRS Duration 88        QTc 406    P Axis 84    R AXIS 63    T Axis 46    Interpretation ECG      Sinus bradycardia  Right atrial enlargement  Voltage criteria for left ventricular hypertrophy  Early repolarization  Abnormal ECG  When compared with ECG of 12-APR-2022 21:39,  T wave amplitude has increased in Lateral leads       Laboratory:  Labs Ordered and Resulted from Time of ED Arrival to Time of ED Departure   CK TOTAL - Abnormal       Result Value     (*)    CBC WITH PLATELETS AND DIFFERENTIAL - Abnormal    WBC Count 10.9      RBC Count 4.65      Hemoglobin 14.3      Hematocrit 44.0      MCV 95      MCH 30.8      MCHC 32.5      RDW 12.8      Platelet Count 216      % Neutrophils 82      % Lymphocytes 10      % Monocytes 7      % Eosinophils 0      % Basophils 0      % Immature Granulocytes 1      NRBCs per 100 WBC 0      Absolute Neutrophils 9.0 (*)     Absolute Lymphocytes 1.1      Absolute Monocytes 0.8      Absolute Eosinophils 0.0      Absolute Basophils 0.0      Absolute Immature Granulocytes 0.1      Absolute NRBCs 0.0     BASIC METABOLIC PANEL - Normal    Sodium 138      Potassium 3.7      Chloride 104      Carbon Dioxide (CO2) 25      Anion Gap 9      Urea Nitrogen 22      Creatinine 0.96      Calcium 9.8      Glucose 84      GFR Estimate >90     TROPONIN I - Normal    Troponin I High Sensitivity 8     INFLUENZA A/B & SARS-COV2 PCR MULTIPLEX - Normal    Influenza A PCR Negative      Influenza B PCR Negative      RSV PCR Negative      SARS CoV2 PCR Negative        Emergency Department Course:       Reviewed:  I reviewed nursing notes, vitals, past  medical history and Care Everywhere    Assessments:  2320 I obtained history and examined the patient as noted above.   0109 I rechecked the patient and explained findings.     Interventions:  2335: Bolus 1 L IV  2335: Toradol 15 mg IV  2335: Zofran 4 mg IV    Disposition:  The patient was discharged to home.     Impression & Plan     Medical Decision Making:  Patient presents after chills body aches and not feeling well.  This is after boxing.  He is not febrile.  COVID and flu testing are negative.  Could be viral illness.  Wonder about dehydration due to acute illness after exercise in the heat.  IV fluids and antinausea medication made to feel patient feel better no clinical concerns for meningitis or bacteremia.  Patient symptoms resolved after IV fluids and was discharged in stable condition.    Diagnosis:    ICD-10-CM    1. Mild dehydration  E86.0        Discharge Medications:  Discharge Medication List as of 7/13/2022  1:15 AM      START taking these medications    Details   !! ondansetron (ZOFRAN ODT) 4 MG ODT tab Take 1 tablet (4 mg) by mouth every 6 hours as needed for nausea or vomiting, Disp-10 tablet, R-0, Local Print       !! - Potential duplicate medications found. Please discuss with provider.          Scribe Disclosure:  I, Brie Zheng, am serving as a scribe at 11:19 PM on 7/12/2022 to document services personally performed by Neeraj Don MD based on my observations and the provider's statements to me.            Neeraj Don MD  07/15/22 2316

## 2022-07-13 NOTE — DISCHARGE INSTRUCTIONS
We have found no test answers to chest pain or feeling lightheaded or weak.  We suspect mild dehydration as you have been boxing in the heat.  Continue to orally hydrate using 4 to 6 glasses of 8 ounces of water a day may be more if you are doing a lot of cardiovascular exercise.  Use Zofran when needed for nausea continue Tylenol or ibuprofen for muscle aches.  Return with high fever increased shortness of breath or shortness of breath with exertion or other concerns.

## 2022-10-03 ENCOUNTER — HEALTH MAINTENANCE LETTER (OUTPATIENT)
Age: 27
End: 2022-10-03

## 2023-01-04 ENCOUNTER — HOSPITAL ENCOUNTER (EMERGENCY)
Facility: CLINIC | Age: 28
Discharge: HOME | End: 2023-01-04
Attending: PHYSICIAN ASSISTANT | Admitting: PHYSICIAN ASSISTANT
Payer: COMMERCIAL

## 2023-01-04 VITALS
TEMPERATURE: 99.9 F | HEART RATE: 73 BPM | DIASTOLIC BLOOD PRESSURE: 79 MMHG | OXYGEN SATURATION: 100 % | SYSTOLIC BLOOD PRESSURE: 137 MMHG | RESPIRATION RATE: 22 BRPM

## 2023-01-04 DIAGNOSIS — J02.0 STREPTOCOCCAL PHARYNGITIS: ICD-10-CM

## 2023-01-04 DIAGNOSIS — R82.81 PYURIA: ICD-10-CM

## 2023-01-04 LAB
ALBUMIN UR-MCNC: 70 MG/DL
APPEARANCE UR: CLEAR
BILIRUB UR QL STRIP: NEGATIVE
COLOR UR AUTO: YELLOW
DEPRECATED S PYO AG THROAT QL EIA: POSITIVE
FLUAV RNA SPEC QL NAA+PROBE: NEGATIVE
FLUBV RNA RESP QL NAA+PROBE: NEGATIVE
GLUCOSE UR STRIP-MCNC: NEGATIVE MG/DL
HGB UR QL STRIP: NEGATIVE
HYALINE CASTS: 2 /LPF
KETONES UR STRIP-MCNC: >150 MG/DL
LEUKOCYTE ESTERASE UR QL STRIP: ABNORMAL
MUCOUS THREADS #/AREA URNS LPF: PRESENT /LPF
NITRATE UR QL: NEGATIVE
PH UR STRIP: 7 [PH] (ref 5–7)
RBC URINE: 3 /HPF
RSV RNA SPEC NAA+PROBE: NEGATIVE
SARS-COV-2 RNA RESP QL NAA+PROBE: NEGATIVE
SP GR UR STRIP: 1.01 (ref 1–1.03)
UROBILINOGEN UR STRIP-MCNC: 6 MG/DL
WBC URINE: 17 /HPF

## 2023-01-04 PROCEDURE — 87637 SARSCOV2&INF A&B&RSV AMP PRB: CPT | Performed by: EMERGENCY MEDICINE

## 2023-01-04 PROCEDURE — 87086 URINE CULTURE/COLONY COUNT: CPT | Performed by: PHYSICIAN ASSISTANT

## 2023-01-04 PROCEDURE — C9803 HOPD COVID-19 SPEC COLLECT: HCPCS

## 2023-01-04 PROCEDURE — 81001 URINALYSIS AUTO W/SCOPE: CPT | Performed by: PHYSICIAN ASSISTANT

## 2023-01-04 PROCEDURE — 99283 EMERGENCY DEPT VISIT LOW MDM: CPT

## 2023-01-04 PROCEDURE — 250N000013 HC RX MED GY IP 250 OP 250 PS 637: Performed by: EMERGENCY MEDICINE

## 2023-01-04 PROCEDURE — 87880 STREP A ASSAY W/OPTIC: CPT | Performed by: PHYSICIAN ASSISTANT

## 2023-01-04 RX ORDER — ACETAMINOPHEN 500 MG
1000 TABLET ORAL EVERY 4 HOURS PRN
Status: DISCONTINUED | OUTPATIENT
Start: 2023-01-04 | End: 2023-01-04 | Stop reason: HOSPADM

## 2023-01-04 RX ORDER — ONDANSETRON 4 MG/1
4 TABLET, ORALLY DISINTEGRATING ORAL ONCE
Status: DISCONTINUED | OUTPATIENT
Start: 2023-01-04 | End: 2023-01-04 | Stop reason: HOSPADM

## 2023-01-04 RX ORDER — PENICILLIN V POTASSIUM 500 MG/1
500 TABLET, FILM COATED ORAL 2 TIMES DAILY
Qty: 20 TABLET | Refills: 0 | Status: SHIPPED | OUTPATIENT
Start: 2023-01-04 | End: 2023-01-14

## 2023-01-04 RX ORDER — IBUPROFEN 600 MG/1
600 TABLET, FILM COATED ORAL ONCE
Status: COMPLETED | OUTPATIENT
Start: 2023-01-04 | End: 2023-01-04

## 2023-01-04 RX ADMIN — IBUPROFEN 600 MG: 600 TABLET ORAL at 09:04

## 2023-01-04 RX ADMIN — ACETAMINOPHEN 1000 MG: 500 TABLET ORAL at 09:04

## 2023-01-04 ASSESSMENT — ENCOUNTER SYMPTOMS
SORE THROAT: 1
COUGH: 1
NAUSEA: 1
VOMITING: 1

## 2023-01-04 NOTE — LETTER
January 4, 2023      To Whom It May Concern:      Opal Tan was seen in our Emergency Department today, 01/04/23.  I expect his condition to improve over the next 1-2 days.  He may return to work when improved.    Sincerely,        Caio Rogers PA-C

## 2023-01-04 NOTE — ED TRIAGE NOTES
A&O x4, ABCs intact. Pt presents with generalized body aches, vomiting and coughing that started last night. Pt last took ibuprofen last night        Triage Assessment     Row Name 01/04/23 0900       Triage Assessment (Adult)    Airway WDL WDL       Respiratory WDL    Respiratory WDL WDL       Cardiac WDL    Cardiac WDL WDL

## 2023-01-04 NOTE — ED PROVIDER NOTES
History     Chief Complaint:  Pharyngitis, Vomiting, Cough     HPI   Opal Tan is a 27 year old male who presents to the ED for evaluation of pharyngitis, vomiting, and cough.  Patient reports shortly prior to bed last night he developed low back pain and thereafter developed chills and diaphoresis.  He then developed pharyngitis and cough, and overnight was up innumerable times vomiting.  No measured fever.  No rhinorrhea or congestion.  No ear pain.  No abdominal pain.    Independent Historian: Yes    ROS:  Review of Systems   HENT: Positive for sore throat.    Respiratory: Positive for cough.    Gastrointestinal: Positive for nausea and vomiting.   All other systems reviewed and are negative.      Allergies:  No Known Allergies     Medications:    None    Past Medical History:    Reviewed, no pertinent past medical history    Social History:  Here with significant other.    PCP: No Ref-Primary, Physician     Physical Exam     Patient Vitals for the past 24 hrs:   BP Temp Temp src Pulse Resp SpO2   01/04/23 0901 137/79 99.9  F (37.7  C) Temporal 73 22 100 %        Physical Exam  Constitutional: Pleasant. Cooperative.   Eyes: Pupils equally round and reactive  HENT: Head is normal in appearance. TMs normal. Moist mucous membranes. Oropharyngeal erythema. No exudates. No palatal asymmetry. Uvula midline. No trismus. No muffled voice. Tolerating their secretions.  Cardiovascular: Regular rate and rhythm.  Respiratory: Normal respiratory effort, lungs are clear bilaterally.  Neck: Normal ROM.   Skin: Normal, without rash.  Neurologic: Cranial nerves grossly intact. Alert.  Nursing notes and vital signs reviewed.    Emergency Department Course     Laboratory:  Labs Ordered and Resulted from Time of ED Arrival to Time of ED Departure   ROUTINE UA WITH MICROSCOPIC REFLEX TO CULTURE - Abnormal       Result Value    Color Urine Yellow      Appearance Urine Clear      Glucose Urine Negative      Bilirubin Urine  Negative      Ketones Urine >150 (*)     Specific Gravity Urine 1.015      Blood Urine Negative      pH Urine 7.0      Protein Albumin Urine 70 (*)     Urobilinogen Urine 6.0 (*)     Nitrite Urine Negative      Leukocyte Esterase Urine Moderate (*)     Mucus Urine Present (*)     RBC Urine 3 (*)     WBC Urine 17 (*)     Hyaline Casts Urine 2     STREPTOCOCCUS A RAPID SCREEN W REFELX TO PCR - Abnormal    Group A Strep antigen Positive (*)    INFLUENZA A/B & SARS-COV2 PCR MULTIPLEX - Normal    Influenza A PCR Negative      Influenza B PCR Negative      RSV PCR Negative      SARS CoV2 PCR Negative     URINE CULTURE      Emergency Department Course & Assessments:    Interventions:  Medications   acetaminophen (TYLENOL) tablet 1,000 mg (1,000 mg Oral Given 1/4/23 0904)   ondansetron (ZOFRAN ODT) ODT tab 4 mg (has no administration in time range)   ibuprofen (ADVIL/MOTRIN) tablet 600 mg (600 mg Oral Given 1/4/23 0904)      Disposition:  The patient was discharged to home.     Impression & Plan      Medical Decision Making:  Opal Tan is a 27 year old male who presents to the ED for evaluation of pharyngitis, vomiting, and cough. See HPI as above for additional details. Vitals and physical exam as above. DDx was broad and included strep, COVID, influenza, PNA, stone, pyelo, PTA, RPA, intraabodminal pathology such as appendicitis, amongst others. Strep swab returns positive. Suspect this as etiology of patient's symptoms. No CVA TTP, and no red cells on UA to suggest for stone. Patient does have a few WBCs on urine, but no urinary symptoms so doubt this is true UTI, but will culture urine. No abdominal pain, benign abdominal exam. Will provide Abx as below. [place] patient was safe for discharge to home with ongoing Tylenol and ibuprofen. Discussed reasons to return. All questions answered. Patient discharged to home in stable condition.    Diagnosis:    ICD-10-CM    1. Streptococcal pharyngitis  J02.0       2. Pyuria   R82.81            Discharge Medications:  New Prescriptions    PENICILLIN V (VEETID) 500 MG TABLET    Take 1 tablet (500 mg) by mouth 2 times daily for 10 days        1/4/2023   Caio Rogers PA-C     This record was created at least in part using electronic voice recognition software, so please excuse any typographical errors.       Caio Rogers PA-C  01/04/23 2333

## 2023-01-04 NOTE — DISCHARGE INSTRUCTIONS
Use Tylenol and ibuprofen for pain control.  Take full course of antibiotics as prescribed.  Push fluids.  You have a few white cells in your urine. We will culture your urine at this time to ensure that this is not infectious. Given you lack of symptoms, I have low suspicion.

## 2023-01-06 LAB — BACTERIA UR CULT: NO GROWTH

## 2023-02-12 ENCOUNTER — HEALTH MAINTENANCE LETTER (OUTPATIENT)
Age: 28
End: 2023-02-12

## 2023-11-28 ENCOUNTER — HOSPITAL ENCOUNTER (EMERGENCY)
Facility: CLINIC | Age: 28
Discharge: HOME | End: 2023-11-28
Attending: EMERGENCY MEDICINE | Admitting: EMERGENCY MEDICINE
Payer: COMMERCIAL

## 2023-11-28 VITALS
WEIGHT: 155 LBS | DIASTOLIC BLOOD PRESSURE: 73 MMHG | OXYGEN SATURATION: 98 % | RESPIRATION RATE: 18 BRPM | HEIGHT: 71 IN | SYSTOLIC BLOOD PRESSURE: 130 MMHG | HEART RATE: 50 BPM | BODY MASS INDEX: 21.7 KG/M2 | TEMPERATURE: 99 F

## 2023-11-28 DIAGNOSIS — L50.9 URTICARIA: ICD-10-CM

## 2023-11-28 PROCEDURE — 250N000013 HC RX MED GY IP 250 OP 250 PS 637: Performed by: EMERGENCY MEDICINE

## 2023-11-28 PROCEDURE — 99283 EMERGENCY DEPT VISIT LOW MDM: CPT

## 2023-11-28 RX ORDER — DIPHENHYDRAMINE HCL 50 MG
50 CAPSULE ORAL ONCE
Status: COMPLETED | OUTPATIENT
Start: 2023-11-28 | End: 2023-11-28

## 2023-11-28 RX ADMIN — DIPHENHYDRAMINE HYDROCHLORIDE 50 MG: 50 CAPSULE ORAL at 06:30

## 2023-11-28 NOTE — Clinical Note
Opal Tan was seen and treated in our emergency department on 11/28/2023.  He may return to work on 11/30/2023.       If you have any questions or concerns, please don't hesitate to call.      Ciaran Gorman MD

## 2023-11-28 NOTE — DISCHARGE INSTRUCTIONS
You are seen in the emergency department for an ongoing intermittent rash and itching.  Your evaluation seems consistent with urticaria (hives).  This can be caused by a variety of illnesses and environmental exposures but in your case we do think it is probably related to something that he came into contact with on the job site.  We would recommend showering once or twice more and trying to wash off anything that might still be on the skin causing irritation.  Unfortunately this condition can wax and wane for a few days but you are on the correct treatment already.  We would recommend continuing the prednisone daily as previously prescribed and you can use Atarax for further itching.  In addition we would recommend 25 to 50 mg of Benadryl every 6 hours to help with managing histamine release.

## 2023-11-28 NOTE — ED PROVIDER NOTES
EMERGENCY DEPARTMENT ENCOUNTER      NAME: Opal aTn  AGE: 28 year old male  YOB: 1995  MRN: [id number]  EVALUATION DATE & TIME: 2023  6:11 AM    PCP: No Ref-Primary, Physician    ED PROVIDER: Ciaran Gorman M.D.      Chief Complaint   Patient presents with    Hives         FINAL IMPRESSION:  1. Urticaria          ED COURSE & MEDICAL DECISION MAKIN:25 AM I introduced myself to the patient, obtained patient history, performed a physical exam, and discussed plan for ED workup including potential diagnostic laboratory/imaging studies and interventions.    28 year old male presents to the Emergency Department for evaluation of intermittent urticaria.  Was seen yesterday in the urgency room for same symptoms.  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  On current exam I do not see anything for cutaneous rash at all.  Patient says this is waxing and waning.  I do think his  history seems consistent with uncomplicated urticaria.  No signs of any more serious process like anaphylaxis, infection, desquamation, etc.  He is already taking prednisone and hydroxyzine as prescribed yesterday.  We did discuss that he might get some additional benefit from a antihistamine like cetirizine or Benadryl and that urticaria can sometimes be a process which is ongoing for a few days.  Did discuss going home and washing the skin thoroughly to try to remove any offending agents.  Patient was in agreement with this plan and discharged in stable condition.    At the conclusion of the encounter I discussed the results of all of the tests and the disposition. The questions were answered. The patient or family acknowledged understanding and was agreeable with the care plan.       Medical Decision Making    History:  Supplemental history from: Family Member/Significant Other  External Record(s) reviewed: Other: Urgent Care Visit 23    Work Up:  Chart  documentation includes differential considered and any EKGs or imaging independently interpreted by provider, where specified.  In additional to work up documented, I considered the following work up: Documented in chart, if applicable.    External consultation:  Discussion of management with another provider: Documented in chart, if applicable    Complicating factors:  Care impacted by chronic illness: N/A  Care affected by social determinants of health: N/A    Disposition considerations: Discharge. No recommendations on prescription strength medication(s). See documentation for any additional details.            MEDICATIONS GIVEN IN THE EMERGENCY:  Medications   diphenhydrAMINE (BENADRYL) capsule 50 mg (50 mg Oral $Given 11/28/23 0630)       NEW PRESCRIPTIONS STARTED AT TODAY'S ER VISIT  New Prescriptions    No medications on file          =================================================================    HPI    Patient information was obtained from: Patient    Use of : N/A      Opal NEEL Tan is a 28 year old male with no pertinent history who presents to this ED by walk in with a friend for evaluation of hives.    Per chart review, the patient was seen at [facility]23 presenting for evaluation of a rash. No signs of angioedema, respiratory compromise, shock, or serious systemic reaction. Hives resolved after Benadryl injection and oral prednisone. The patient was prescribed hydroxyzine, prednisone, and Pepcid.    The patient reports yesterday morning around 0600 he developed a rash and hives to his bilateral lower extremities. Throughout the day the rash and hives spread up his torso to his upper extremities and face. He was seen in urgent care where he received a dose of IM Benadryl and was then given prescriptions for oral prednisone and hydroxyzine. Despite taking the prescribed medications, last dose was this morning, he has not noticed any significant improvement in the  "itchiness. The hives and rash seem to have mostly improved here in the ED, but the itchiness has worsened. He has not had any shortness of breath, tongue swelling, trouble swallowing, or vomiting.    REVIEW OF SYSTEMS   All systems reviewed and negative except as noted in HPI.    PAST MEDICAL HISTORY:  History reviewed. No pertinent past medical history.    PAST SURGICAL HISTORY:  History reviewed. No pertinent surgical history.        CURRENT MEDICATIONS:    No current facility-administered medications for this encounter.     Current Outpatient Medications   Medication    dextromethorphan (DELSYM) 30 MG/5ML liquid    ondansetron (ZOFRAN-ODT) 4 MG ODT tab         ALLERGIES:  No Known Allergies    FAMILY HISTORY:  History reviewed. No pertinent family history.    SOCIAL HISTORY:   Social History     Socioeconomic History    Marital status: Single   Tobacco Use    Smoking status: Never    Smokeless tobacco: Never       VITALS:  /73   Pulse 50   Temp 99  F (37.2  C) (Temporal)   Resp 18   Ht 1.803 m (5' 11\")   Wt 70.3 kg (155 lb)   SpO2 98%   BMI 21.62 kg/m      PHYSICAL EXAM    Constitutional: Well developed, Well nourished, NAD.  HENT: Normocephalic, Atraumatic. Neck Supple. No mucosal lesions.  Eyes: EOMI, Conjunctiva normal.  Respiratory: Breathing comfortably on room air. Speaks full sentences easily. Lungs clear to ascultation.  Cardiovascular: Normal heart rate, Regular rhythm. No peripheral edema.  Abdomen: Soft  Musculoskeletal: Good range of motion in all major joints. No major deformities noted.  Integument: Warm, Dry. No current rashes or lesions.  Neurologic: Alert & awake, Normal motor function, Normal sensory function, No focal deficits noted.   Psychiatric: Cooperative. Affect appropriate.       IRavi, am serving as a scribe to document services personally performed by Dr. Ciaran Gorman, based on my observation and the provider's statements to me. Ciaran GARCIA MD " attest that Ravi Stiles is acting in a scribe capacity, has observed my performance of the services and has documented them in accordance with my direction.    Ciaran Gorman M.D.  Emergency Medicine  [facility] EMERGENCY ROOM  [address] [facility] [address]  [phone]  Dept: [phone]     Ciaran Gorman MD  11/28/23 0637

## 2023-11-28 NOTE — ED TRIAGE NOTES
Pt developed hives yesterday morning and was seen in the UR, he was given benadryl and prednisone. Nothing is improving. No rash or swelling of face. No SOB.      Triage Assessment (Adult)       Row Name 11/28/23 0606          Triage Assessment    Airway WDL WDL        Respiratory WDL    Respiratory WDL WDL        Skin Circulation/Temperature WDL    Skin Circulation/Temperature WDL WDL        Cardiac WDL    Cardiac WDL WDL        Peripheral/Neurovascular WDL    Peripheral Neurovascular WDL WDL        Cognitive/Neuro/Behavioral WDL    Cognitive/Neuro/Behavioral WDL WDL

## 2024-01-19 ASSESSMENT — ENCOUNTER SYMPTOMS
MYALGIAS: 1
CHILLS: 0
EYE PAIN: 0
SHORTNESS OF BREATH: 1
PARESTHESIAS: 0
NAUSEA: 0
COUGH: 1
JOINT SWELLING: 0
CONSTIPATION: 0
DIARRHEA: 0
ABDOMINAL PAIN: 0
NERVOUS/ANXIOUS: 0
SORE THROAT: 0
HEMATOCHEZIA: 0
PALPITATIONS: 1
DYSURIA: 0
ARTHRALGIAS: 0
FREQUENCY: 0
HEARTBURN: 0
HEMATURIA: 0
DIZZINESS: 1
FEVER: 1
WEAKNESS: 1
HEADACHES: 1

## 2024-01-20 NOTE — COMMUNITY RESOURCES LIST (ENGLISH)
01/20/2024   [facility]  N/A  For questions about this resource list or additional care needs, please contact your primary care clinic or care manager.  Phone: [phone]   Email: N/A   Address: [address] [place] [address]   Hours: N/A        Financial Stability       Utility payment assistance  1  [place] [facility] - Utility payment assistance Distance: 0.85 miles      In-Person, Phone/Virtual   [address] [place] [address]  Language: English  Hours: Mon - Thu 9:00 AM - 3:00 PM  Fees: Free   Phone: (853) 344-6294 Email: Synagogue@[facility].org Website: http://www.[employer].org/care-ministries/     2  [place] [facility] Distance: 0.99 miles      In-Person   [address] [place] [address]  Language: English  Hours: Mon - Fri 8:00 AM - 3:00 PM  Fees: Free   Phone: (488) 497-2066 Ext.14 Email: [facility] Website: http://www.[facility].org          Food and Nutrition       Food pantry  3  [facility] Distance: 0.32 miles      Pickup   [address] [place] [address]  Language: English  Hours: Mon - Fri 8:30 AM - 2:00 PM  Fees: Free   Phone: (765) 419-3169 Email: info@[facility].org Website: http://www.[facility].org/locations/[facility]     4  [facility] Distance: 0.33 miles      Pickup   [address] [address]  Language: English  Hours: Mon - Thu 10:00 AM - 2:00 PM  Fees: Free   Phone: (111) 801-5929 Email: info@[facility].org Website: http://www.[facility].org     SNAP application assistance  5  [employer] [facility] Distance: 1.02 miles      In-Person   [address] [place] [address]  Language: English, Arabic  Hours: Mon - Fri 8:30 AM - 5:00 PM  Fees: Free   Phone: (981) 269-4858 Email: info@[facility] [facility].org Website: http://www.[facility].org/     6  [place]  [facility] Distance: 1.82 miles      Phone/Virtual   [address] [place] [address]  Language: English, Danish  Hours: Mon - Fri 10:00 AM - 5:00 PM  Fees: Free   Phone: (883) 462-2537 Email: armen@[facility].org Website: https://www.[facility].org/behzad-house     Soup kitchen or free meals  7  [facility] [employer] Distance: 0.32 miles      In-Person   [address] [place] [address]  Language: English  Hours: Mon - Fri 12:00 PM - 1:00 PM  Fees: Free   Phone: (380) 777-1125 Email: info@[facility].org Website: http://www.[facility].org/locations/abdoul_ca     8  [facility] - Community Meals Distance: 1.02 miles      In-Person   [address] [place] [id number]  Language: English, Nepali  Hours: Sun 11:00 AM - 12:00 PM  Fees: Free   Phone: (138) 369-8038 Email: rick@[employer] Website: http://www.[facility].org/          Hotlines and Helplines       Hotline - Housing crisis  9  [facility] Distance: 1.66 miles      Phone/Virtual   [address] [place] [id number]  Language: English  Hours: Mon - Sun Open 24 Hours   Phone: (591) 116-8365 Email: info@[facility].org Website: http://www.[facility].org     10  [facility] Distance: 1.74 miles      Phone/Virtual   [address] [place] [id number]  Language: English  Hours: Mon - Sun Open 24 Hours   Phone: (740) 134-5718 Email: communications@[facility].org Website: https://[facility]s-[place].org/oursaviourshousing/          Housing       Coordinated Entry access point  11  [facility] - Housing Services Distance: 1.57 miles      In-Person   [address] [place] [id number]  Language: English  Hours: Mon - Fri 9:00 AM - 5:00 PM  Fees: Free   Phone: (664) 941-5657 Email: housing@[facility].org Website: http://www.[facility].org/\[facility]\""     12  [facility]  [facility] Distance: 2.57 miles      In-Person   [address] [place] [address]  Language: English  Hours: Mon - Sat 10:00 PM - 5:00 PM  Fees: Free   Phone: (228) 811-1521 Email: info@[facility].org Website: http://www.[facility]aintRumford Community HospitalUser Replay/     Drop-in center or day shelter  13  [facility] Distance: 1.93 miles      In-Person   [address] [place] [address]  Language: English  Hours: Mon - Fri 12:00 PM - 3:00 PM  Fees: Free   Phone: (597) 720-3267 Email: [employer] Website: http://[employer].milabent/     14  [facility] Distance: 2.13 miles      In-Person   [address], [place] [address]  Language: English, Singaporean, Malian  Hours: Mon - Sat 7:00 AM - 3:00 PM  Fees: Free, Self Pay   Phone: (468) 295-5177 Email: info@[employer]ent Website: https://www.[employer].org/locations/opportunity-center/     Housing search assistance  15  [facility]  - Office of Multicultural Services Distance: 1.99 miles      Phone/Virtual   [address] [place] [address]  Language: American Sign Language, Irish, Ukrainian, English, Bengali, Hong Konger, Oromo, Filipino, Singaporean, Malian, Swahili, Kun, Chadian  Hours: Mon - Tue 9:00 AM - 4:00 PM , Wed 10:00 AM - 5:00 PM , Thu - Fri 9:00 AM - 4:00 PM  Fees: Free   Phone: (800) 129-7505 Email: oms@[employer]. Website: http://www.[employer]./residents/human-services/multi-cultural-services     16   [facility]) Distance: 2.11 miles      In-Person   [address] [place] [address]  Language: English, Singaporean, Malian  Hours: Mon - Fri 8:30 AM - 4:30 PM  Fees: Free   Phone: (496) 204-2059 Email: alpa@[facility].org Website: http://www.[facility].org/     Shelter for families  17  [facility] Distance: 20.6 miles      In-Person   [address] [facility] [place] [address]  Language:  English  Hours: Mon - Fri 3:00 PM - 9:00 AM , Sat - Sun Open 24 Hours  Fees: Free   Phone: (638) 481-4581 Ext.1 Website: https://www.[facility]aintandrews.[place]/2020/07/03/emergency-family-shelter/     Shelter for individuals  18  [facility] Distance: 1.74 miles      In-Person   [address] [place] [address]  Language: English  Hours: Mon - Sun Open 24 Hours  Fees: Free   Phone: (988) 443-8252 Email: communications@[facility].org Website: https://[facility]-[place].org/oursaviourshousing/     19  [facility] Distance: 2.82 miles      In-Person   [address] [place] [address]  Language: English  Hours: Mon - Fri 4:00 PM - 9:00 AM  Fees: Free   Phone: (356) 241-2050 Email: ibeth@[facility] Website: https://[facility].org/[facility]/          Transportation       Free or low-cost transportation  20  [facility] Distance: 0.76 miles      In-Person   [address] [place] [address]  Language: English  Hours: Mon - Fri 9:00 AM - 12:00 PM , Mon - Fri 1:00 PM - 3:00 PM  Fees: Self Pay   Phone: (625) 931-3388 Email: info@[facility].[place] Website: https://www.[facility].org/[place]     21  [facility] Distance: 0.99 miles      In-Person   [address] [place] [address]  Language: English  Hours: Mon - Fri 8:00 AM - 3:00 PM  Fees: Free   Phone: (157) 715-9992 Ext.14 Email: neighborhood@[facility] HospitalSavvySystems[facility].[place] Website: http://www.BetterWorks (Closed)[facility].org     Transportation to medical appointments  22  [employer] [facility] [place] Distance: 5.88 miles      In-Person   [address] [address]  Language: English  Hours: Mon - Sun Open 24 Hours  Fees: Insurance, Self Pay   Phone: (391) 113-8611 Website: https://www.[employer]/lashell/?L=true     23  [facility] [employer] Distance: 7.23 miles      In-Person   [address] [place] [address]  Language: English,  Ivorian  Hours: Mon - Sun Appt. Only  Fees: Self Pay   Phone: (108) 397-3184 Email: romulo@[employer] Website: http://www.[employer]/          Important Numbers & Websites       Emergency Services   911  [facility] Services   311  Poison Control   (443) 731-5859  Suicide Prevention Lifeline   (250) 784-6833 (TALK)  Child Abuse Hotline   (346) 990-2856 (4-A-Child)  Sexual Assault Hotline   (334) 607-7845 (HOPE)  National Runaway Safeline   (233) 900-7918 (RUNAWAY)  All-Options Talkline   (253) 936-9597  Substance Abuse Referral   (360) 978-4830 (HELP)

## 2024-01-24 ENCOUNTER — OFFICE VISIT (OUTPATIENT)
Dept: FAMILY MEDICINE | Facility: CLINIC | Age: 29
End: 2024-01-24
Payer: COMMERCIAL

## 2024-01-24 VITALS
TEMPERATURE: 98 F | BODY MASS INDEX: 20.99 KG/M2 | WEIGHT: 155 LBS | DIASTOLIC BLOOD PRESSURE: 78 MMHG | SYSTOLIC BLOOD PRESSURE: 124 MMHG | OXYGEN SATURATION: 100 % | HEART RATE: 74 BPM | HEIGHT: 72 IN

## 2024-01-24 DIAGNOSIS — Z13.220 SCREENING FOR LIPID DISORDERS: ICD-10-CM

## 2024-01-24 DIAGNOSIS — J45.20 MILD INTERMITTENT ASTHMA WITHOUT COMPLICATION: ICD-10-CM

## 2024-01-24 DIAGNOSIS — Z13.1 SCREENING FOR DIABETES MELLITUS: ICD-10-CM

## 2024-01-24 DIAGNOSIS — Z23 NEED FOR HPV VACCINE: ICD-10-CM

## 2024-01-24 DIAGNOSIS — Z59.00 HOMELESSNESS: ICD-10-CM

## 2024-01-24 DIAGNOSIS — Z11.4 SCREENING FOR HIV (HUMAN IMMUNODEFICIENCY VIRUS): ICD-10-CM

## 2024-01-24 DIAGNOSIS — F43.10 PTSD (POST-TRAUMATIC STRESS DISORDER): ICD-10-CM

## 2024-01-24 DIAGNOSIS — Z87.891 HISTORY OF NICOTINE VAPING: ICD-10-CM

## 2024-01-24 DIAGNOSIS — Z11.59 NEED FOR HEPATITIS C SCREENING TEST: ICD-10-CM

## 2024-01-24 DIAGNOSIS — Z00.00 ROUTINE GENERAL MEDICAL EXAMINATION AT A HEALTH CARE FACILITY: Primary | ICD-10-CM

## 2024-01-24 DIAGNOSIS — F33.0 MILD RECURRENT MAJOR DEPRESSION (H): ICD-10-CM

## 2024-01-24 DIAGNOSIS — F41.1 GENERALIZED ANXIETY DISORDER: ICD-10-CM

## 2024-01-24 DIAGNOSIS — Z11.3 SCREEN FOR STD (SEXUALLY TRANSMITTED DISEASE): ICD-10-CM

## 2024-01-24 PROCEDURE — 99213 OFFICE O/P EST LOW 20 MIN: CPT | Mod: 25 | Performed by: PHYSICIAN ASSISTANT

## 2024-01-24 PROCEDURE — 90686 IIV4 VACC NO PRSV 0.5 ML IM: CPT | Performed by: PHYSICIAN ASSISTANT

## 2024-01-24 PROCEDURE — 82947 ASSAY GLUCOSE BLOOD QUANT: CPT | Performed by: PHYSICIAN ASSISTANT

## 2024-01-24 PROCEDURE — 90651 9VHPV VACCINE 2/3 DOSE IM: CPT | Performed by: PHYSICIAN ASSISTANT

## 2024-01-24 PROCEDURE — 87491 CHLMYD TRACH DNA AMP PROBE: CPT | Performed by: PHYSICIAN ASSISTANT

## 2024-01-24 PROCEDURE — 36415 COLL VENOUS BLD VENIPUNCTURE: CPT | Performed by: PHYSICIAN ASSISTANT

## 2024-01-24 PROCEDURE — 80061 LIPID PANEL: CPT | Performed by: PHYSICIAN ASSISTANT

## 2024-01-24 PROCEDURE — 86803 HEPATITIS C AB TEST: CPT | Performed by: PHYSICIAN ASSISTANT

## 2024-01-24 PROCEDURE — 87591 N.GONORRHOEAE DNA AMP PROB: CPT | Performed by: PHYSICIAN ASSISTANT

## 2024-01-24 PROCEDURE — 90471 IMMUNIZATION ADMIN: CPT | Performed by: PHYSICIAN ASSISTANT

## 2024-01-24 PROCEDURE — 90472 IMMUNIZATION ADMIN EACH ADD: CPT | Performed by: PHYSICIAN ASSISTANT

## 2024-01-24 PROCEDURE — 86780 TREPONEMA PALLIDUM: CPT | Performed by: PHYSICIAN ASSISTANT

## 2024-01-24 PROCEDURE — 87389 HIV-1 AG W/HIV-1&-2 AB AG IA: CPT | Performed by: PHYSICIAN ASSISTANT

## 2024-01-24 PROCEDURE — 99395 PREV VISIT EST AGE 18-39: CPT | Mod: 25 | Performed by: PHYSICIAN ASSISTANT

## 2024-01-24 RX ORDER — ALBUTEROL SULFATE 90 UG/1
2 AEROSOL, METERED RESPIRATORY (INHALATION)
COMMUNITY
Start: 2023-12-08 | End: 2024-01-24

## 2024-01-24 RX ORDER — HYDROXYZINE HYDROCHLORIDE 25 MG/1
25-50 TABLET, FILM COATED ORAL
COMMUNITY
Start: 2023-11-27 | End: 2024-01-24

## 2024-01-24 RX ORDER — ALBUTEROL SULFATE 90 UG/1
2 AEROSOL, METERED RESPIRATORY (INHALATION) EVERY 4 HOURS PRN
Qty: 18 G | Refills: 1 | Status: SHIPPED | OUTPATIENT
Start: 2024-01-24

## 2024-01-24 SDOH — ECONOMIC STABILITY - HOUSING INSECURITY: HOMELESSNESS UNSPECIFIED: Z59.00

## 2024-01-24 ASSESSMENT — ENCOUNTER SYMPTOMS
DIARRHEA: 0
SHORTNESS OF BREATH: 1
NAUSEA: 0
DYSURIA: 0
ABDOMINAL PAIN: 0
FEVER: 1
CONSTIPATION: 0
COUGH: 1
SORE THROAT: 0
WEAKNESS: 1
ARTHRALGIAS: 0
NERVOUS/ANXIOUS: 0
FREQUENCY: 0
JOINT SWELLING: 0
CHILLS: 0
DIZZINESS: 1
HEMATURIA: 0
MYALGIAS: 1
PALPITATIONS: 1
EYE PAIN: 0
HEADACHES: 1

## 2024-01-24 ASSESSMENT — PATIENT HEALTH QUESTIONNAIRE - PHQ9
SUM OF ALL RESPONSES TO PHQ QUESTIONS 1-9: 9
SUM OF ALL RESPONSES TO PHQ QUESTIONS 1-9: 9
10. IF YOU CHECKED OFF ANY PROBLEMS, HOW DIFFICULT HAVE THESE PROBLEMS MADE IT FOR YOU TO DO YOUR WORK, TAKE CARE OF THINGS AT HOME, OR GET ALONG WITH OTHER PEOPLE: SOMEWHAT DIFFICULT
5. POOR APPETITE OR OVEREATING: NEARLY EVERY DAY

## 2024-01-24 ASSESSMENT — ANXIETY QUESTIONNAIRES
1. FEELING NERVOUS, ANXIOUS, OR ON EDGE: NEARLY EVERY DAY
2. NOT BEING ABLE TO STOP OR CONTROL WORRYING: NEARLY EVERY DAY
7. FEELING AFRAID AS IF SOMETHING AWFUL MIGHT HAPPEN: SEVERAL DAYS
5. BEING SO RESTLESS THAT IT IS HARD TO SIT STILL: SEVERAL DAYS
GAD7 TOTAL SCORE: 17
3. WORRYING TOO MUCH ABOUT DIFFERENT THINGS: NEARLY EVERY DAY
IF YOU CHECKED OFF ANY PROBLEMS ON THIS QUESTIONNAIRE, HOW DIFFICULT HAVE THESE PROBLEMS MADE IT FOR YOU TO DO YOUR WORK, TAKE CARE OF THINGS AT HOME, OR GET ALONG WITH OTHER PEOPLE: VERY DIFFICULT
6. BECOMING EASILY ANNOYED OR IRRITABLE: NEARLY EVERY DAY
GAD7 TOTAL SCORE: 17

## 2024-01-24 NOTE — PROGRESS NOTES
Answers submitted by the patient for this visit:  Patient Health Questionnaire (Submitted on 1/24/2024)  If you checked off any problems, how difficult have these problems made it for you to do your work, take care of things at home, or get along with other people?: Somewhat difficult  PHQ9 TOTAL SCORE: 9  Annual Preventive Visit (Submitted on 1/19/2024)  Chief Complaint: Annual Exam:  Blood in stool: No  heartburn: No  peripheral edema: No  mood changes: Yes  Skin sensation changes: No  impotence: No    Preventive Care Visit  [facility]  FLAKO Yost, Physician Assistant - Medical  Jan 24, 2024      SUBJECTIVE:   Opal is a 28 year old, presenting for the following:  No chief complaint on file.        1/24/2024     3:32 PM   Additional Questions   Accompanied by na         1/24/2024     3:32 PM   Patient Reported Additional Medications   Patient reports taking the following new medications none     Healthy Habits:     Getting at least 3 servings of Calcium per day:  Yes    Bi-annual eye exam:  Yes    Dental care twice a year:  NO    Sleep apnea or symptoms of sleep apnea:  None    Diet:  Other    Frequency of exercise:  2-3 days/week    Duration of exercise:  Greater than 60 minutes    Taking medications regularly:  Yes    Medication side effects:  None    Additional concerns today:  No    Additional provider notes :   Currently in school. 1st year in [employer]. It has been 3 yrs since he was incarcerated. He has been a struggle since incarcerated to adapted to society. He has a long history of PTSD, anxiety, and depression. He is not currently on any medication for it. When he was in retirement it was hard to get treatment for this. He is trying to get housing now and needs forms filled out today. He is currently homeless.          Today's PHQ-9 Score:       1/24/2024     3:22 PM   PHQ-9 SCORE   PHQ-9 Total Score MyChart 9 (Mild depression)   PHQ-9 Total Score 9         1/24/2024  "    4:10 PM   KAN-7 SCORE   Total Score 17        Have you ever done Advance Care Planning? (For example, a Health Directive, POLST, or a discussion with a medical provider or your loved ones about your wishes): No, advance care planning information given to patient to review.  Patient declined advance care planning discussion at this time.    Social History     Tobacco Use    Smoking status: Never    Smokeless tobacco: Never   Substance Use Topics    Alcohol use: Not on file           1/19/2024     8:18 PM   Alcohol Use   Prescreen: >3 drinks/day or >7 drinks/week? Not Applicable       Last PSA: No results found for: \"PSA\"    Reviewed orders with patient. Reviewed health maintenance and updated orders accordingly - Yes  Lab work is in process    Reviewed and updated as needed this visit by clinical staff                  Reviewed and updated as needed this visit by Provider                    Review of Systems   Constitutional:  Positive for fever. Negative for chills.   HENT:  Positive for congestion. Negative for ear pain, hearing loss and sore throat.    Eyes:  Negative for pain and visual disturbance.   Respiratory:  Positive for cough and shortness of breath.    Cardiovascular:  Positive for chest pain and palpitations.   Gastrointestinal:  Negative for abdominal pain, constipation, diarrhea and nausea.   Genitourinary:  Negative for dysuria, frequency, genital sores, hematuria, penile discharge and urgency.   Musculoskeletal:  Positive for myalgias. Negative for arthralgias and joint swelling.   Skin:  Negative for rash.   Neurological:  Positive for dizziness, weakness and headaches.   Psychiatric/Behavioral:  The patient is not nervous/anxious.          OBJECTIVE:   There were no vitals taken for this visit.   Estimated body mass index is 21.62 kg/m  as calculated from the following:    Height as of 11/28/23: 1.803 m (5' 11\").    Weight as of 11/28/23: 70.3 kg (155 lb).  Physical Exam  GENERAL: alert and no " distress  EYES: Eyes grossly normal to inspection, PERRL and conjunctivae and sclerae normal  HENT: ear canals and TM's normal, nose and mouth without ulcers or lesions  NECK: no adenopathy, no asymmetry, masses, or scars  RESP: lungs clear to auscultation - no rales, rhonchi or wheezes  CV: regular rate and rhythm, normal S1 S2, no S3 or S4, no murmur, click or rub, no peripheral edema  ABDOMEN: soft, nontender, no hepatosplenomegaly, no masses and bowel sounds normal  MS: no gross musculoskeletal defects noted, no edema  SKIN: no suspicious lesions or rashes  NEURO: Normal strength and tone, mentation intact and speech normal  PSYCH: mentation appears normal, affect normal/bright  LYMPH: no cervical, supraclavicular, axillary, or inguinal adenopathy    Diagnostic Test Results:  Labs reviewed in Epic    ASSESSMENT/PLAN:   Routine general medical examination at a health care facility  Repeat 1 year     Screening for HIV (human immunodeficiency virus)  - HIV Antigen Antibody Combo; Future    Need for hepatitis C screening test  - Hepatitis C Screen Reflex to HCV RNA Quant and Genotype; Future    History of nicotine vaping  Congratulated patient on quitting. Encouraged continue cessation    Generalized anxiety disorder  Chronic, diagnosed years ago,  is on no medication, his  requested he seeing a mental health specialist for his mental health conditions. I agree with this plan. Patient agree's with this plan and has no further questions  - Adult Mental Health  Referral; Future    PTSD (post-traumatic stress disorder)  Chronic, diagnosed years ago,  is on no medication, his  requested he seeing a mental health specialist for his mental health conditions. I agree with this plan. Patient agree's with this plan and has no further questions  - Adult Mental Health  Referral; Future    Mild recurrent major depression (H24)  Chronic, diagnosed years ago,  is on no  medication, his  requested he seeing a mental health specialist for his mental health conditions. I agree with this plan. Patient agree's with this plan and has no further questions  - Adult Mental Health  Referral; Future    Screening for diabetes mellitus  - Lipid panel reflex to direct LDL Fasting; Future    Screening for lipid disorders  - Glucose; Future    Screen for STD (sexually transmitted disease)  - Chlamydia trachomatis/Neisseria gonorrhoeae by PCR - Clinic Collect  - Treponema Abs w Reflex to RPR and Titer; Future    Mild intermittent asthma without complication  Stable, refills send  - albuterol (PROAIR HFA/PROVENTIL HFA/VENTOLIN HFA) 108 (90 Base) MCG/ACT inhaler; Inhale 2 puffs into the lungs every 4 hours as needed for shortness of breath, wheezing or cough    Homelessness  Paperwork filled out to day for [place] department of human services for Housing. Was incarcerated about 3 yrs ago. Has been struggling with his mental health and finding housing    Patient has been advised of split billing requirements and indicates understanding: Yes      Counseling  Reviewed preventive health counseling, as reflected in patient instructions       Regular exercise       Healthy diet/nutrition        He reports that he has never smoked. He has never used smokeless tobacco.            Signed Electronically by: FLAKO Yost

## 2024-01-25 ENCOUNTER — TELEPHONE (OUTPATIENT)
Dept: BEHAVIORAL HEALTH | Facility: CLINIC | Age: 29
End: 2024-01-25
Payer: COMMERCIAL

## 2024-01-25 LAB
CHOLEST SERPL-MCNC: 115 MG/DL
FASTING STATUS PATIENT QL REPORTED: YES
FASTING STATUS PATIENT QL REPORTED: YES
GLUCOSE SERPL-MCNC: 74 MG/DL (ref 70–99)
HCV AB SERPL QL IA: NONREACTIVE
HDLC SERPL-MCNC: 53 MG/DL
HIV 1+2 AB+HIV1 P24 AG SERPL QL IA: NONREACTIVE
LDLC SERPL CALC-MCNC: 55 MG/DL
NONHDLC SERPL-MCNC: 62 MG/DL
T PALLIDUM AB SER QL: NONREACTIVE
TRIGL SERPL-MCNC: 37 MG/DL

## 2024-01-25 NOTE — TELEPHONE ENCOUNTER
Reached patient explained calling to schedule appointment from MH referral. Patient stated is in class and will call back when available.     Camryn Gordillo  Transition Clinic Coordinator  01/25/24 10:28 AM

## 2024-01-26 LAB
C TRACH DNA SPEC QL PROBE+SIG AMP: NEGATIVE
N GONORRHOEA DNA SPEC QL NAA+PROBE: NEGATIVE

## 2024-01-26 NOTE — TELEPHONE ENCOUNTER
Writer spoke with pt and scheduled initial TC therapy appointment on 01/30/2024 @  11:30 am. Patient [id number] order and may need DA.Writer sent intake documents via [employer]. Tracker completed.    Radhika Gastelum  01/26/2024  816

## 2024-04-08 ENCOUNTER — PATIENT OUTREACH (OUTPATIENT)
Dept: FAMILY MEDICINE | Facility: CLINIC | Age: 29
End: 2024-04-08

## 2024-04-08 NOTE — TELEPHONE ENCOUNTER
Patient Quality Outreach    Patient is due for the following:   Asthma  -  ACT needed, Asthma follow-up visit, and AAP  Depression  -  DAP      Topic Date Due    Pneumococcal Vaccine (1 of 2 - PCV) Never done    COVID-19 Vaccine (2 - 2023-24 season) 09/01/2023    HPV Vaccine (3 - Male 3-dose series) 04/17/2024       Next Steps:   Schedule a office visit for Asthma recheck    Type of outreach:    Sent [employer] message.    Next Steps:  Reach out within 90 days via Letter.    Max number of attempts reached:  1/2 . Will try again in 90 days if patient still on fail list.    Questions for provider review:    None         [email or web address], MEDICAL ASSISTANT

## 2024-12-26 ENCOUNTER — PATIENT OUTREACH (OUTPATIENT)
Dept: CARE COORDINATION | Facility: CLINIC | Age: 29
End: 2024-12-26

## 2025-01-09 ENCOUNTER — PATIENT OUTREACH (OUTPATIENT)
Dept: CARE COORDINATION | Facility: CLINIC | Age: 30
End: 2025-01-09

## 2025-01-29 ENCOUNTER — OFFICE VISIT (OUTPATIENT)
Dept: FAMILY MEDICINE | Facility: CLINIC | Age: 30
End: 2025-01-29
Payer: COMMERCIAL

## 2025-01-29 VITALS
WEIGHT: 173.25 LBS | RESPIRATION RATE: 16 BRPM | HEART RATE: 53 BPM | OXYGEN SATURATION: 98 % | HEIGHT: 72 IN | BODY MASS INDEX: 23.46 KG/M2 | TEMPERATURE: 98.1 F | DIASTOLIC BLOOD PRESSURE: 71 MMHG | SYSTOLIC BLOOD PRESSURE: 115 MMHG

## 2025-01-29 DIAGNOSIS — M54.16 LUMBAR BACK PAIN WITH RADICULOPATHY AFFECTING RIGHT LOWER EXTREMITY: Primary | ICD-10-CM

## 2025-01-29 DIAGNOSIS — J45.20 MILD INTERMITTENT ASTHMA WITHOUT COMPLICATION: ICD-10-CM

## 2025-01-29 PROCEDURE — 99213 OFFICE O/P EST LOW 20 MIN: CPT | Performed by: PHYSICIAN ASSISTANT

## 2025-01-29 RX ORDER — METHYLPREDNISOLONE 4 MG/1
TABLET ORAL
Qty: 21 TABLET | Refills: 0 | Status: SHIPPED | OUTPATIENT
Start: 2025-01-29

## 2025-01-29 RX ORDER — ALBUTEROL SULFATE 90 UG/1
2 INHALANT RESPIRATORY (INHALATION) EVERY 4 HOURS PRN
Qty: 18 G | Refills: 1 | Status: SHIPPED | OUTPATIENT
Start: 2025-01-29

## 2025-01-29 ASSESSMENT — ENCOUNTER SYMPTOMS: BACK PAIN: 1

## 2025-01-29 ASSESSMENT — PATIENT HEALTH QUESTIONNAIRE - PHQ9
SUM OF ALL RESPONSES TO PHQ QUESTIONS 1-9: 6
SUM OF ALL RESPONSES TO PHQ QUESTIONS 1-9: 6
10. IF YOU CHECKED OFF ANY PROBLEMS, HOW DIFFICULT HAVE THESE PROBLEMS MADE IT FOR YOU TO DO YOUR WORK, TAKE CARE OF THINGS AT HOME, OR GET ALONG WITH OTHER PEOPLE: SOMEWHAT DIFFICULT

## 2025-01-29 ASSESSMENT — ASTHMA QUESTIONNAIRES
QUESTION_2 LAST FOUR WEEKS HOW OFTEN HAVE YOU HAD SHORTNESS OF BREATH: NOT AT ALL
ACT_TOTALSCORE: 25
QUESTION_4 LAST FOUR WEEKS HOW OFTEN HAVE YOU USED YOUR RESCUE INHALER OR NEBULIZER MEDICATION (SUCH AS ALBUTEROL): NOT AT ALL
QUESTION_5 LAST FOUR WEEKS HOW WOULD YOU RATE YOUR ASTHMA CONTROL: COMPLETELY CONTROLLED
QUESTION_1 LAST FOUR WEEKS HOW MUCH OF THE TIME DID YOUR ASTHMA KEEP YOU FROM GETTING AS MUCH DONE AT WORK, SCHOOL OR AT HOME: NONE OF THE TIME
ACT_TOTALSCORE: 25
QUESTION_3 LAST FOUR WEEKS HOW OFTEN DID YOUR ASTHMA SYMPTOMS (WHEEZING, COUGHING, SHORTNESS OF BREATH, CHEST TIGHTNESS OR PAIN) WAKE YOU UP AT NIGHT OR EARLIER THAN USUAL IN THE MORNING: NOT AT ALL

## 2025-01-29 ASSESSMENT — PAIN SCALES - GENERAL: PAINLEVEL_OUTOF10: MODERATE PAIN (6)

## 2025-01-29 NOTE — PROGRESS NOTES
Assessment & Plan     Lumbar back pain with radiculopathy affecting right lower extremity  No red flags on exam today. Discussed with patient today I suspect his symptoms are related to sciatic nerve pain. Also considered disc herniation. For now I recommend medrol dose pack and at home conservative treatment, and PHYSICAL THERAPY. If no improvement then need to consider MRI. Patient agree's with this plan and has no further questions.  - methylPREDNISolone (MEDROL DOSEPAK) 4 MG tablet therapy pack; Follow Package Directions  - Physical Therapy  Referral; Future    Mild intermittent asthma without complication  Chronic, stable on current medication, refills good for 1 year  - albuterol (PROAIR HFA/PROVENTIL HFA/VENTOLIN HFA) 108 (90 Base) MCG/ACT inhaler; Inhale 2 puffs into the lungs every 4 hours as needed for shortness of breath, wheezing or cough.                Aram Joseph is a 29 year old, presenting for the following health issues:  Back Pain      Back Pain   This is a new problem. The current episode started more than 1 week ago. The problem occurs constantly. The problem has been gradually worsening. The pain is associated with no known injury. The pain is at a severity of 7/10. The pain is moderate. The symptoms are aggravated by bending, twisting and certain positions. The pain is The same all the time. Stiffness is present All day. He has tried ice, heat and NSAIDs for the symptoms. The treatment provided no relief.      Asthma : chronic, uses albuterol PRN. Dust is usually the trigger for him. Feel like its well managed      Concern - back pain   Onset: 2 months - started to worsen over this time and in particular through out the 1-2 weeks   Description: started to have pain in the low back, mostly on the right side, the pain is radiating into the buttocks and into the leg. The pain is sharp with movement. If he stands for a period of time the pain is better but then he gets  "numbness in his right. Some weakness in the right leg.   Intensity: moderate  Progression of Symptoms:  worsening  Accompanying Signs & Symptoms: numbness and pain into the right leg. No loss of bowel or bladder. No fevers or unintentional weight loss   Previous history of similar problem: never  Precipitating factors:        Worsened by: with certain movements  Alleviating factors:        Improved by: ibuprofen    Therapies tried and outcome: stretching  He is does exercise pretty consistently but unable to do his daily activity because of the pain and leg. He stopped about 1 month ago.       Review of Systems  Constitutional, HEENT, cardiovascular, pulmonary, gi and gu systems are negative, except as otherwise noted.      Objective    /71   Pulse 53   Temp 98.1  F (36.7  C) (Oral)   Resp 16   Ht 1.84 m (6' 0.44\")   Wt 78.6 kg (173 lb 4 oz)   SpO2 98%   BMI 23.21 kg/m    Body mass index is 23.21 kg/m .  Physical Exam   GENERAL: alert and no distress  ORTHO: BACK : no bony deformity, decrease ROM on flexion and extension due to pain, normal rotation, tender to palpation of right paraspinal lumbar muscles and right SI joint, strength 4/5 to right lower extremity, 5/5 to left, straight leg test positive or right leg  SKIN: no suspicious lesions or rashes            Signed Electronically by: FLAKO Yost    Answers submitted by the patient for this visit:  Patient Health Questionnaire (Submitted on 1/29/2025)  If you checked off any problems, how difficult have these problems made it for you to do your work, take care of things at home, or get along with other people?: Somewhat difficult  PHQ9 TOTAL SCORE: 6    "

## 2025-01-29 NOTE — PROGRESS NOTES
{PROVIDER CHARTING PREFERENCE:418206}    Aram Joseph is a 29 year old, presenting for the following health issues:  Back Pain      1/29/2025     9:32 AM   Additional Questions   Roomed by [email or web address]     Back Pain   This is a new problem. The current episode started more than 1 week ago. The problem occurs constantly. The problem has been gradually worsening. The pain is associated with no known injury. The pain is present in the lumbar spine and gluteal region. The quality of the pain is described as stabbing. The pain radiates to the right thigh. The pain is at a severity of 7/10. The pain is moderate. The symptoms are aggravated by bending, twisting and certain positions. The pain is The same all the time. Stiffness is present All day. He has tried ice, heat and NSAIDs for the symptoms. The treatment provided no relief.        {MA/LPN/RN Pre-Provider Visit Orders- hCG/UA/Strep (Optional):038547}  {SUPERLIST (Optional):270401}  {additonal problems for provider to add (Optional):985613}    {ROS Picklists (Optional):694756}      Objective    There were no vitals taken for this visit.  There is no height or weight on file to calculate BMI.  Physical Exam   {Exam List (Optional):546051}    {Diagnostic Test Results (Optional):232737}        Signed Electronically by: FLAKO Yost  {Email feedback regarding this note to [email or web address]   :[id number]}

## 2025-01-29 NOTE — PROGRESS NOTES
{PROVIDER CHARTING PREFERENCE:094048}    Aram Joseph is a 29 year old, presenting for the following health issues:  Back Pain      1/29/2025     9:32 AM   Additional Questions   Roomed by [email or web address]     Back Pain        {MA/LPN/RN Pre-Provider Visit Orders- hCG/UA/Strep (Optional):809876}  {SUPERLIST (Optional):534722}  {additonal problems for provider to add (Optional):955761}    {ROS Picklists (Optional):474140}      Objective    There were no vitals taken for this visit.  There is no height or weight on file to calculate BMI.  Physical Exam   {Exam List (Optional):310855}    {Diagnostic Test Results (Optional):455728}        Signed Electronically by: FLAKO Yost  {Email feedback regarding this note to [email or web address]   :[id number]}

## 2025-01-29 NOTE — LETTER
My Asthma Action Plan    Name: Opal Tan   YOB: 1995  Date: 1/29/2025   My doctor: FLAKO Yost   My clinic: [facility]        My Rescue Medicine:   Albuterol inhaler (Proair/Ventolin/Proventil HFA)  2-4 puffs EVERY 4 HOURS as needed. Use a spacer if recommended by your provider.   My Asthma Severity:   Intermittent / Exercise Induced  Know your asthma triggers: dust mites             GREEN ZONE   Good Control  I feel good  No cough or wheeze  Can work, sleep and play without asthma symptoms       Take your asthma control medicine every day.     If exercise triggers your asthma, take your rescue medication  15 minutes before exercise or sports, and  During exercise if you have asthma symptoms  Spacer to use with inhaler: If you have a spacer, make sure to use it with your inhaler             YELLOW ZONE Getting Worse  I have ANY of these:  I do not feel good  Cough or wheeze  Chest feels tight  Wake up at night   Keep taking your Green Zone medications  Start taking your rescue medicine:  every 20 minutes for up to 1 hour. Then every 4 hours for 24-48 hours.  If you stay in the Yellow Zone for more than 12-24 hours, contact your doctor.  If you do not return to the Green Zone in 12-24 hours or you get worse, start taking your oral steroid medicine if prescribed by your provider.           RED ZONE Medical Alert - Get Help  I have ANY of these:  I feel awful  Medicine is not helping  Breathing getting harder  Trouble walking or talking  Nose opens wide to breathe       Take your rescue medicine NOW  If your provider has prescribed an oral steroid medicine, start taking it NOW  Call your doctor NOW  If you are still in the Red Zone after 20 minutes and you have not reached your doctor:  Take your rescue medicine again and  Call 911 or go to the emergency room right away    See your regular doctor within 2 weeks of an Emergency Room or Urgent Care visit for  follow-up treatment.          Annual Reminders:  Meet with Asthma Educator,  Flu Shot in the Fall, consider Pneumonia Vaccination for patients with asthma (aged 19 and older).    Pharmacy:    [id number] - [place] [place] - [address]  [id number] - [place], [place] - [address]  [id number] - [place], [place] - [address] AT [address]    Electronically signed by FLAKO Yost   Date: 01/29/25                    Asthma Triggers  How To Control Things That Make Your Asthma Worse    Triggers are things that make your asthma worse.  Look at the list below to help you find your triggers and   what you can do about them. You can help prevent asthma flare-ups by staying away from your triggers.      Trigger                                                          What you can do   Cigarette Smoke  Tobacco smoke can make asthma worse. Do not allow smoking in your home, car or around you.  Be sure no one smokes at a child s day care or school.  If you smoke, ask your health care provider for ways to help you quit.  Ask family members to quit too.  Ask your health care provider for a referral to Quit Plan to help you quit smoking, or call 1-330-281-PLAN.     Colds, Flu, Bronchitis  These are common triggers of asthma. Wash your hands often.  Don t touch your eyes, nose or mouth.  Get a flu shot every year.     Dust Mites  These are tiny bugs that live in cloth or carpet. They are too small to see. Wash sheets and blankets in hot water every week.   Encase pillows and mattress in dust mite proof covers.  Avoid having carpet if you can. If you have carpet, vacuum weekly.   Use a dust mask and HEPA vacuum.   Pollen and Outdoor Mold  Some people are allergic to trees, grass, or weed pollen, or molds. Try to keep your windows closed.  Limit time out doors when pollen count is high.   Ask you  health care provider about taking medicine during allergy season.     Animal Dander  Some people are allergic to skin flakes, urine or saliva from pets with fur or feathers. Keep pets with fur or feathers out of your home.    If you can t keep the pet outdoors, then keep the pet out of your bedroom.  Keep the bedroom door closed.  Keep pets off cloth furniture and away from stuffed toys.     Mice, Rats, and Cockroaches  Some people are allergic to the waste from these pests.   Cover food and garbage.  Clean up spills and food crumbs.  Store grease in the refrigerator.   Keep food out of the bedroom.   Indoor Mold  This can be a trigger if your home has high moisture. Fix leaking faucets, pipes, or other sources of water.   Clean moldy surfaces.  Dehumidify basement if it is damp and smelly.   Smoke, Strong Odors, and Sprays  These can reduce air quality. Stay away from strong odors and sprays, such as perfume, powder, hair spray, paints, smoke incense, paint, cleaning products, candles and new carpet.   Exercise or Sports  Some people with asthma have this trigger. Be active!  Ask your doctor about taking medicine before sports or exercise to prevent symptoms.    Warm up for 5-10 minutes before and after sports or exercise.     Other Triggers of Asthma  Cold air:  Cover your nose and mouth with a scarf.  Sometimes laughing or crying can be a trigger.  Some medicines and food can trigger asthma.

## 2025-01-29 NOTE — LETTER
My Depression Action Plan  Name: Opal Tan   Date of Birth 1995  Date: 1/29/2025    My doctor: Alyssa Shane   My clinic: [address] [address] [phone]            GREEN    ZONE   Good Control    What it looks like:   Things are going generally well. You have normal ups and downs. You may even feel depressed from time to time, but bad moods usually last less than a day.   What you need to do:  Continue to care for yourself (see self care plan)  Check your depression survival kit and update it as needed  Follow your physician s recommendations including any medication.  Do not stop taking medication unless you consult with your physician first.             YELLOW         ZONE Getting Worse    What it looks like:   Depression is starting to interfere with your life.   It may be hard to get out of bed; you may be starting to isolate yourself from others.  Symptoms of depression are starting to last most all day and this has happened for several days.   You may have suicidal thoughts but they are not constant.   What you need to do:     Call your care team. Your response to treatment will improve if you keep your care team informed of your progress. Yellow periods are signs an adjustment may need to be made.     Continue your self-care.  Just get dressed and ready for the day.  Don't give yourself time to talk yourself out of it.    Talk to someone in your support network.    Open up your Depression Self-Care Plan/Wellness Kit.             RED    ZONE Medical Alert - Get Help    What it looks like:   Depression is seriously interfering with your life.   You may experience these or other symptoms: You can t get out of bed most days, can t work or engage in other necessary activities, you have trouble taking care of basic hygiene, or basic responsibilities, thoughts of suicide or death that will not go away,  self-injurious behavior.     What you need to do:  Call your care team and request a same-day appointment. If they are not available (weekends or after hours) call your local crisis line, emergency room or 911.          Depression Self-Care Plan / Wellness Kit    Many people find that medication and therapy are helpful treatments for managing depression. In addition, making small changes to your everyday life can help to boost your mood and improve your wellbeing. Below are some tips for you to consider. Be sure to talk with your medical provider and/or behavioral health consultant if your symptoms are worsening or not improving.     Sleep   Sleep hygiene  means all of the habits that support good, restful sleep. It includes maintaining a consistent bedtime and wake time, using your bedroom only for sleeping or sex, and keeping the bedroom dark and free of distractions like a computer, smartphone, or television.     Develop a Healthy Routine  Maintain good hygiene. Get out of bed in the morning, make your bed, brush your teeth, take a shower, and get dressed. Don t spend too much time viewing media that makes you feel stressed. Find time to relax each day.    Exercise  Get some form of exercise every day. This will help reduce pain and release endorphins, the  feel good  chemicals in your brain. It can be as simple as just going for a walk or doing some gardening, anything that will get you moving.      Diet  Strive to eat healthy foods, including fruits and vegetables. Drink plenty of water. Avoid excessive sugar, caffeine, alcohol, and other mood-altering substances.     Stay Connected with Others  Stay in touch with friends and family members.    Manage Your Mood  Try deep breathing, massage therapy, biofeedback, or meditation. Take part in fun activities when you can. Try to find something to smile about each day.     Psychotherapy  Be open to working with a therapist if your provider recommends it.      Medication  Be sure to take your medication as prescribed. Most anti-depressants need to be taken every day. It usually takes several weeks for medications to work. Not all medicines work for all people. It is important to follow-up with your provider to make sure you have a treatment plan that is working for you. Do not stop your medication abruptly without first discussing it with your provider.    Crisis Resources   These hotlines are for both adults and children. They and are open 24 hours a day, 7 days a week unless noted otherwise.    National Suicide Prevention Lifeline   988 or 9-534-887-OFPE (5251)    Crisis Text Line    www.crisistextline.org  Text HOME to 058167 from anywhere in the [place], anytime, about any type of crisis. A live, trained crisis counselor will receive the text and respond quickly.    [place] Lifeline for LGBTQ Youth  A national crisis intervention and suicide lifeline for LGBTQ youth under 25. Provides a safe place to talk without judgement. Call 1-744.877.5341; text START to 723431 or visit www.thetrevorproject.org to talk to a trained counselor.    For [facility] crisis numbers, visit the [facility] website at:  https://[place].gov/dhs/people-we-serve/adults/health-care/mental-health/resources/crisis-contacts.jsp

## 2025-01-29 NOTE — PROGRESS NOTES
{PROVIDER CHARTING PREFERENCE:383627}    Aram Joseph is a 29 year old, presenting for the following health issues:  Back Pain      1/29/2025     9:32 AM   Additional Questions   Roomed by [email or web address]     Back Pain        {MA/LPN/RN Pre-Provider Visit Orders- hCG/UA/Strep (Optional):753104}  {SUPERLIST (Optional):498550}  {additonal problems for provider to add (Optional):514593}    {ROS Picklists (Optional):576898}      Objective    There were no vitals taken for this visit.  There is no height or weight on file to calculate BMI.  Physical Exam   {Exam List (Optional):625854}    {Diagnostic Test Results (Optional):441419}        Signed Electronically by: FLAKO Yost  {Email feedback regarding this note to [email or web address]   :867738}

## 2025-03-16 ENCOUNTER — HEALTH MAINTENANCE LETTER (OUTPATIENT)
Age: 30
End: 2025-03-16

## 2025-07-23 ENCOUNTER — PATIENT OUTREACH (OUTPATIENT)
Dept: CARE COORDINATION | Facility: CLINIC | Age: 30
End: 2025-07-23
Payer: COMMERCIAL

## 2025-07-29 ENCOUNTER — HOSPITAL ENCOUNTER (EMERGENCY)
Facility: CLINIC | Age: 30
Discharge: HOME | End: 2025-07-30
Attending: EMERGENCY MEDICINE
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
## 2025-07-29 DIAGNOSIS — S69.92XA INJURY OF LEFT HAND BY NAIL GUN, INITIAL ENCOUNTER: Primary | ICD-10-CM

## 2025-07-29 PROCEDURE — 99283 EMERGENCY DEPT VISIT LOW MDM: CPT | Performed by: EMERGENCY MEDICINE

## 2025-07-29 RX ORDER — IBUPROFEN 600 MG/1
600 TABLET, FILM COATED ORAL ONCE
Status: COMPLETED | OUTPATIENT
Start: 2025-07-30 | End: 2025-07-30

## 2025-07-29 ASSESSMENT — COLUMBIA-SUICIDE SEVERITY RATING SCALE - C-SSRS
1. IN THE PAST MONTH, HAVE YOU WISHED YOU WERE DEAD OR WISHED YOU COULD GO TO SLEEP AND NOT WAKE UP?: NO
2. HAVE YOU ACTUALLY HAD ANY THOUGHTS OF KILLING YOURSELF IN THE PAST MONTH?: NO
6. HAVE YOU EVER DONE ANYTHING, STARTED TO DO ANYTHING, OR PREPARED TO DO ANYTHING TO END YOUR LIFE?: NO

## 2025-07-29 NOTE — Clinical Note
Opal Tan was seen and treated in our emergency department on 7/29/2025.  He may return to work on 07/31/2025.       If you have any questions or concerns, please don't hesitate to call.      Hema Polo RN

## 2025-07-30 VITALS
DIASTOLIC BLOOD PRESSURE: 59 MMHG | OXYGEN SATURATION: 99 % | HEIGHT: 73 IN | HEART RATE: 52 BPM | WEIGHT: 185 LBS | SYSTOLIC BLOOD PRESSURE: 116 MMHG | RESPIRATION RATE: 17 BRPM | TEMPERATURE: 98.1 F | BODY MASS INDEX: 24.52 KG/M2

## 2025-07-30 PROCEDURE — 250N000013 HC RX MED GY IP 250 OP 250 PS 637: Performed by: EMERGENCY MEDICINE

## 2025-07-30 RX ORDER — CEPHALEXIN 500 MG/1
500 CAPSULE ORAL 3 TIMES DAILY
Qty: 21 CAPSULE | Refills: 0 | Status: SHIPPED | OUTPATIENT
Start: 2025-07-30 | End: 2025-08-06

## 2025-07-30 RX ADMIN — IBUPROFEN 600 MG: 600 TABLET ORAL at 00:05

## 2025-07-30 NOTE — DISCHARGE INSTRUCTIONS
Urgently, x-ray does not show any fractures.  Like we discussed, we are starting an antibiotic to ensure it does not get infected.  If you start seeing redness, fever, drainage, these could be signs of infection and you should return for evaluation.

## 2025-07-30 NOTE — ED PROVIDER NOTES
EMERGENCY DEPARTMENT ENCOUNTER     NAME: Opal Tan   AGE: 29 year old male   YOB: 1995   MRN: [id number]   EVALUATION DATE & TIME: 7/29/2025 11:45 PM   PCP: System, Provider Not In     Chief Complaint   Patient presents with    Hand Injury   :    FINAL IMPRESSION       1. Injury of left hand by nail gun, initial encounter           ED COURSE & MEDICAL DECISION MAKING      Pertinent Labs & Imaging studies reviewed. (See chart for details)   29 year old male  presents to the Emergency Department for evaluation of hand injury. Initial Vitals Reviewed. Initial exam notable for well-appearing male who has a healing puncture wound just proximal to the dorsal aspect of his left thumb.  There are no current signs of infection with no warmth, purulence and only some mild edema.  Range of motion, sensation are intact with no signs of nerve or tendon injury.  I have a low suspicion that this was bony as he was easily able to remove it himself and suspect it was more soft tissue in nature, but he endorsed concerns about ruling out fracture so we did get an x-ray.  Imaging reviewed interpreted by me and confirmed by radiology as negative.  Tetanus up-to-date in 2023.  I am going to treat with prophylactic antibiotics and he is comfortable with this plan and return precautions at time of discharge.           At the conclusion of the encounter I discussed the results of all of the tests and the disposition. The questions were answered. The patient or family acknowledged understanding and was agreeable with the care plan.     0 minutes critical care time, see procedure note below for details if relevant    Medical Decision Making  I obtained history from Family Member/Significant Other  I reviewed the EMR: Outpatient Record: Immunization record  I independently interpreted the XR and note no fracture. See radiology report for final interpretation.  Discharge. I prescribed additional prescription strength  medication(s) as charted. See documentation for any additional details.    MIPS:  Not Applicable    SEPSIS: None                      MEDICATIONS GIVEN IN THE EMERGENCY:   Medications   ibuprofen (ADVIL/MOTRIN) tablet 600 mg (600 mg Oral $Given 7/30/25 0005)      NEW PRESCRIPTIONS STARTED AT TODAY'S ER VISIT   New Prescriptions    CEPHALEXIN (KEFLEX) 500 MG CAPSULE    Take 1 capsule (500 mg) by mouth 3 times daily for 7 days.     ================================================================   HISTORY OF PRESENT ILLNESS       Patient information was obtained from: Patient    Use of Intrepreter: N/A  Opal NEEL Tan is a 29 year old male with history of anxiety and PTSD who presents with hand injury.     Patient reports accidentally shooting his hand with a nail gun at work yesterday. He pulled the nail out right away. Patient is worried about possible bone fracture. He claims the injury is not too painful and is able to move his hand. Last tdap was last year.     ================================================================        PAST HISTORY     PAST MEDICAL HISTORY:   No past medical history on file.   PAST SURGICAL HISTORY:   No past surgical history on file.   CURRENT MEDICATIONS:   albuterol (PROAIR HFA/PROVENTIL HFA/VENTOLIN HFA) 108 (90 Base) MCG/ACT inhaler  cephALEXin (KEFLEX) 500 MG capsule  methylPREDNISolone (MEDROL DOSEPAK) 4 MG tablet therapy pack      ALLERGIES:   No Known Allergies   FAMILY HISTORY:   Family History   Problem Relation Age of Onset    Substance Abuse Father       SOCIAL HISTORY:   Social History     Socioeconomic History    Marital status: Single   Occupational History    Occupation: [employer]   Tobacco Use    Smoking status: Never    Smokeless tobacco: Never   Vaping Use    Vaping status: Former    Substances: Nicotine   Substance and Sexual Activity    Alcohol use: Not Currently    Drug use: Not Currently    Sexual activity: Not Currently     Partners: Female  "    Social Drivers of Health     Financial Resource Strain: High Risk (1/19/2024)    Financial Resource Strain     Within the past 12 months, have you or your family members you live with been unable to get utilities (heat, electricity) when it was really needed?: Yes   Food Insecurity: High Risk (1/19/2024)    Food Insecurity     Within the past 12 months, did you worry that your food would run out before you got money to buy more?: Yes     Within the past 12 months, did the food you bought just not last and you didn t have money to get more?: Yes   Transportation Needs: High Risk (1/19/2024)    Transportation Needs     Within the past 12 months, has lack of transportation kept you from medical appointments, getting your medicines, non-medical meetings or appointments, work, or from getting things that you need?: Yes    [facility] from [facility]    Social Connections   Interpersonal Safety: Low Risk  (1/29/2025)    Interpersonal Safety     Do you feel physically and emotionally safe where you currently live?: Yes     Within the past 12 months, have you been hit, slapped, kicked or otherwise physically hurt by someone?: No     Within the past 12 months, have you been humiliated or emotionally abused in other ways by your partner or ex-partner?: No   Housing Stability: High Risk (1/19/2024)    Housing Stability     Do you have housing? : No     Are you worried about losing your housing?: Patient declined        VITALS  Patient Vitals for the past 24 hrs:   BP Temp Temp src Pulse Resp SpO2 Height Weight   07/29/25 2334 136/75 98.1  F (36.7  C) Oral 56 18 98 % 1.854 m (6' 1\") 83.9 kg (185 lb)        ================================================================    PHYSICAL EXAM     VITAL SIGNS: /75   Pulse 56   Temp 98.1  F (36.7  C) (Oral)   Resp 18   Ht 1.854 m (6' 1\")   Wt 83.9 kg (185 lb)   SpO2 98%   BMI 24.41 kg/m     Constitutional:  Awake, no acute distress "   HENT:  Atraumatic, oropharynx without exudate or erythema, membranes moist  Lymph:  No adenopathy  Eyes: EOM intact, PERRL, no injection  Neck: Supple  Respiratory:  Clear to auscultation bilaterally, no wheezes or crackles   Cardiovascular:  Regular rate and rhythm, single S1 and S2   GI:  Soft, nontender, nondistended, no rebound or guarding   Musculoskeletal:  Moves all extremities, no lower extremity edema, no deformities, healing puncture left dorsal hand, normal ROM  Skin:  Warm, dry  Neurologic:  Alert and oriented x3, no focal deficits noted       ================================================================  LAB       All pertinent labs reviewed and interpreted.   Labs Ordered and Resulted from Time of ED Arrival to Time of ED Departure - No data to display     ===============================================================  RADIOLOGY       Reviewed all pertinent imaging. Please see official radiology report.   XR Hand Left G/E 3 Views   Final Result   IMPRESSION: Normal joint spaces and alignment. No fracture.            ================================================================  EKG         I have independently reviewed and interpreted the EKG(s) documented above.     ================================================================  PROCEDURES         I, Elizabeth Reyes-Coca, am serving as a scribe to document services personally performed by Dr. Yap based on my observation and the provider's statements to me. I, Meena Yap MD attest that Elizabeth Reyes-Coca is acting in a scribe capacity, has observed my performance of the services and has documented them in accordance with my direction.   Meena Yap M.D.   Emergency Medicine   [facility] EMERGENCY ROOM  [address] [address]  [phone]  Dept: [phone]      Meena Yap MD  07/30/25 0030

## 2025-07-30 NOTE — ED TRIAGE NOTES
Last night around midnight pt was working and shot himself with a nail gun in the left hand.  Pts last tetanus was 3/29/23     Triage Assessment (Adult)       Row Name 07/29/25 7028          Triage Assessment    Airway WDL WDL        Respiratory WDL    Respiratory WDL WDL        Skin Circulation/Temperature WDL    Skin Circulation/Temperature WDL WDL        Cardiac WDL    Cardiac WDL WDL        Peripheral/Neurovascular WDL    Peripheral Neurovascular WDL WDL